# Patient Record
Sex: FEMALE | Race: WHITE | Employment: UNEMPLOYED | URBAN - METROPOLITAN AREA
[De-identification: names, ages, dates, MRNs, and addresses within clinical notes are randomized per-mention and may not be internally consistent; named-entity substitution may affect disease eponyms.]

---

## 2017-12-05 ENCOUNTER — HOSPITAL ENCOUNTER (OUTPATIENT)
Dept: NON INVASIVE DIAGNOSTICS | Age: 47
Discharge: HOME | End: 2017-12-05
Attending: NURSE PRACTITIONER
Payer: COMMERCIAL

## 2017-12-05 DIAGNOSIS — R00.2 PALPITATIONS: ICD-10-CM

## 2017-12-05 PROCEDURE — 93225 XTRNL ECG REC<48 HRS REC: CPT

## 2018-01-02 ENCOUNTER — HOSPITAL ENCOUNTER (OUTPATIENT)
Dept: MAMMOGRAPHY | Age: 48
Discharge: HOME | End: 2018-01-02
Attending: FAMILY MEDICINE
Payer: COMMERCIAL

## 2018-01-02 DIAGNOSIS — Z12.31 VISIT FOR SCREENING MAMMOGRAM: ICD-10-CM

## 2018-01-02 PROCEDURE — 77063 BREAST TOMOSYNTHESIS BI: CPT

## 2018-01-30 ENCOUNTER — HOSPITAL ENCOUNTER (OUTPATIENT)
Dept: NUCLEAR MEDICINE | Age: 48
Discharge: HOME | End: 2018-01-30
Attending: INTERNAL MEDICINE
Payer: COMMERCIAL

## 2018-01-30 VITALS — BODY MASS INDEX: 27.9 KG/M2 | WEIGHT: 160 LBS

## 2018-01-30 DIAGNOSIS — R10.13 EPIGASTRIC PAIN: ICD-10-CM

## 2018-01-30 PROCEDURE — 74011000258 HC RX REV CODE- 258: Performed by: INTERNAL MEDICINE

## 2018-01-30 PROCEDURE — 74011250636 HC RX REV CODE- 250/636: Performed by: INTERNAL MEDICINE

## 2018-01-30 PROCEDURE — 78227 HEPATOBIL SYST IMAGE W/DRUG: CPT

## 2018-01-30 RX ORDER — SODIUM CHLORIDE 9 MG/ML
50 INJECTION, SOLUTION INTRAVENOUS CONTINUOUS
Status: DISCONTINUED | OUTPATIENT
Start: 2018-01-30 | End: 2018-02-03 | Stop reason: HOSPADM

## 2018-01-30 RX ADMIN — SINCALIDE 1.45 MCG: 5 INJECTION, POWDER, LYOPHILIZED, FOR SOLUTION INTRAVENOUS at 10:37

## 2018-01-30 RX ADMIN — SODIUM CHLORIDE 50 ML/HR: 900 INJECTION, SOLUTION INTRAVENOUS at 10:37

## 2018-02-09 ENCOUNTER — OFFICE VISIT (OUTPATIENT)
Dept: SURGERY | Age: 48
End: 2018-02-09

## 2018-02-09 VITALS
HEIGHT: 64 IN | RESPIRATION RATE: 16 BRPM | SYSTOLIC BLOOD PRESSURE: 120 MMHG | DIASTOLIC BLOOD PRESSURE: 78 MMHG | TEMPERATURE: 98.1 F | BODY MASS INDEX: 27.96 KG/M2 | WEIGHT: 163.8 LBS | HEART RATE: 75 BPM | OXYGEN SATURATION: 97 %

## 2018-02-09 DIAGNOSIS — K82.8 BILIARY DYSKINESIA: Primary | ICD-10-CM

## 2018-02-09 DIAGNOSIS — R10.11 ABDOMINAL PAIN, RIGHT UPPER QUADRANT: ICD-10-CM

## 2018-02-09 RX ORDER — FLUOXETINE HYDROCHLORIDE 40 MG/1
CAPSULE ORAL
Refills: 3 | COMMUNITY
Start: 2018-01-16

## 2018-02-09 RX ORDER — PANTOPRAZOLE SODIUM 40 MG/1
TABLET, DELAYED RELEASE ORAL
Refills: 1 | COMMUNITY
Start: 2018-01-11

## 2018-02-09 NOTE — LETTER
2/9/2018 1:46 PM 
 
Patient:  Jessica Golden YOB: 1970 Date of Visit: 2/9/2018 Carmen Beatty MD 
[address] [place] [address] [address] [place] [address] VIA Facsimile: [phone] Dear Carmen Beatty MD, Thank you for referring Ms. Eliot Hong to [place] [address] for evaluation and treatment. Below are the relevant portions of my assessment and plan of care. Thank you very much for your referral of Ms. Eliot Hong. If you have questions, please do not hesitate to call me. I look forward to following Ms. Reynaldo London along with you and will keep you updated as to her progress. Sincerely, Anahi Leon MD

## 2018-02-09 NOTE — PROGRESS NOTES
General Surgery Consult      Jessica Golden  Admit date: (Not on file)    MRN: [id number]     : 1970     Age: 52 y.o. Attending Physician: Anahi Leon MD, FACS      Subjective:     Yovanny Beck is a 52 y.o. female with a history of abdominal pain. The pain is mainly localized in the epigastric area and the right upper quadrant. She has been having this pain for years she also has severe gastroesophageal reflux disease. She also has nausea and sometimes vomiting. She recently had upper endoscopy by Dr. Bridget Burk that was normal.  She had a CT scan in 2016 that did not show any explanation for her pain. She recently had a HIDA scan that showed biliary dyskinesia for which she was referred to me for cholecystectomy. She had a robotic hysterectomy in the past. The patient  has not had jaundice, acholic stools or dark urine and has not had a history of pancreatitis or hepatitis. Findings of ultrasound of the abdomen: normal.     There are no active problems to display for this patient. Past Medical History:   Diagnosis Date    Allergy       Past Surgical History:   Procedure Laterality Date    HX GYN      HX ORTHOPAEDIC        Social History   Substance Use Topics    Smoking status: Former Smoker     Quit date: 2009    Smokeless tobacco: Never Used    Alcohol use No      History   Smoking Status    Former Smoker    Quit date: 2009   Smokeless Tobacco    Never Used     No family history on file. Current Outpatient Prescriptions   Medication Sig    FLUoxetine (PROZAC) 40 mg capsule TAKE ONE CAPSULE BY MOUTH DAILY    pantoprazole (PROTONIX) 40 mg tablet TAKE 1 TABLET BY MOUTH 30 MINUTES BEFORE BREAKFAST    LEVOTHYROXINE SODIUM (LEVOTHYROXINE PO) Take  by mouth.  cholecalciferol, vitamin d3, (VITAMIN D3) 1,000 unit tablet Take  by mouth daily.  FERROUS FUMARATE/VIT BCOMP&C (SUPER B COMPLEX PO) Take  by mouth.     CYMBALTA 60 mg capsule     omeprazole (PRILOSEC) 40 mg capsule  ranitidine (ZANTAC) 300 mg tablet      No current facility-administered medications for this visit. Allergies   Allergen Reactions    Amoxicillin Other (comments)     Seizure      Bactrim [Sulfamethoprim] Hives        Review of Systems:  Constitutional: negative  Eyes: negative  Ears, Nose, Mouth, Throat, and Face: negative  Respiratory: negative  Cardiovascular: negative  Gastrointestinal: positive for nausea and abdominal pain  Genitourinary:negative  Integument/Breast: negative  Hematologic/Lymphatic: negative  Musculoskeletal:negative  Neurological: negative  Behavioral/Psychiatric: negative  Endocrine: negative  Allergic/Immunologic: negative    Objective:     Visit Vitals    /78 (BP 1 Location: Left arm, BP Patient Position: Sitting)    Pulse 75    Temp 98.1 °F (36.7 °C) (Oral)    Resp 16    Ht 5' 3.5\" (1.613 m)    Wt 74.3 kg (163 lb 12.8 oz)    SpO2 97%    BMI 28.56 kg/m2       Physical Exam:      General:  in no apparent distress, alert, oriented times 3, afebrile and normal vitals   Eyes:  conjunctivae and sclerae normal, pupils equal, round, reactive to light   Throat & Neck: no erythema or exudates noted and neck supple and symmetrical; no palpable masses   Lungs:   clear to auscultation bilaterally   Heart:  Regular rate and rhythm   Abdomen:   rounded, soft, non tender except for right upper quadrant and epigastric tenderness, nondistended, no masses or organomegaly.   No abdominal wall hernias   Extremities: extremities normal, atraumatic, no cyanosis or edema   Skin: Normal.         Imaging and Lab Review:     CBC:   Lab Results   Component Value Date/Time    WBC 8.4 08/27/2011 02:45 AM    RBC 4.12 (L) 08/27/2011 02:45 AM    HGB 12.1 08/27/2011 02:45 AM    HCT 36.5 08/27/2011 02:45 AM    PLATELET 277 93/96/9070 02:45 AM     BMP:   Lab Results   Component Value Date/Time    Glucose 95 08/27/2011 02:45 AM    Sodium 137 08/27/2011 02:45 AM    Potassium 3.8 08/27/2011 02:45 AM Chloride 102 08/27/2011 02:45 AM    CO2 34 (H) 08/27/2011 02:45 AM    BUN 9 08/27/2011 02:45 AM    Creatinine 0.8 08/27/2011 02:45 AM    Calcium 9.0 08/27/2011 02:45 AM     CMP:  Lab Results   Component Value Date/Time    Glucose 95 08/27/2011 02:45 AM    Sodium 137 08/27/2011 02:45 AM    Potassium 3.8 08/27/2011 02:45 AM    Chloride 102 08/27/2011 02:45 AM    CO2 34 (H) 08/27/2011 02:45 AM    BUN 9 08/27/2011 02:45 AM    Creatinine 0.8 08/27/2011 02:45 AM    Calcium 9.0 08/27/2011 02:45 AM    Anion gap 1 (L) 08/27/2011 02:45 AM    BUN/Creatinine ratio 11 (L) 08/27/2011 02:45 AM    Alk. phosphatase 79 08/27/2011 02:45 AM    Protein, total 7.2 08/27/2011 02:45 AM    Albumin 3.4 08/27/2011 02:45 AM    Globulin 3.8 08/27/2011 02:45 AM    A-G Ratio 0.9 08/27/2011 02:45 AM       No results found for this or any previous visit (from the past 24 hour(s)). images and reports reviewed    Assessment:   Saloni Garduno is a 52 y.o. female is presenting with abdominal pain and a picture of biliary dyskinesia. I explained the indications for robotic cholecystectomy as well as the alternatives. I discussed the potential risks, including but not limited to bleeding, wound infection, trocar injuries, bile duct injury and leak, and also the possible need for conversion to open procedure. I also explained the firefly technology and how it allow us to visualize the biliary tree to avoid bile duct injury or leak. She indicates that she understands the risks, accepts and wishes to proceed. Plan:     1. Will schedule for robotic cholecystectomy with firefly (ICG) technology for identification of the biliary tree. 2. No heavy lifting (more than 15 pounds) for 2 weeks after the surgery. 3. Avoid constipation after the surgery (take stool softener).       Please call me if you have any questions (cell phone: [phone])     Signed By: Rubi Neil MD     February 9, 2018

## 2018-02-09 NOTE — PATIENT INSTRUCTIONS
If you have any questions or concerns about today's appointment, the verbal and/or written instructions you were given for follow up care, please call our office at [phone]. [facility] - [address], [address] [place] [address]    [phone] office  [phone] fax    . PATIENT PRE AND POST OPERATIVE INSTRUCTIONS     [facility]   [address], [address]  [phone]    Before Surgery Instructions:   1) You must have someone available to drive you to and from your procedure and stay with you for the first 24 hours. 2) It is very important that you have nothing to eat or drink after midnight the night before your surgery. This includes chewing gum or sucking on hard candy. Take only heart, blood pressure and cholesterol medications the morning of surgery with only a sip of water. 3) Please stop taking Plavix 10 days prior to your surgery. Stop taking Coumadin 5 days prior to your surgery. Stop taking all Aspirin or Aspirin containing products 7 days prior to your surgery. Stop taking Advil, Motrin, Aleve, and etc. 3 days prior to your surgery. 4) If you take any diabetic medications please consult with your primary care physician on how to take them on the day of your surgery  5) Please stop all Herbal products 2 weeks prior to your surgery. 6) Please arrive at the hospital 2 hours prior to your surgery, unless you have been otherwise instructed. 7) Patients having an operation on their colon will be given a separate instruction sheet on their Bowel Prep. 8) For any pre-operative work up check in at the main entrance to [facility], and then go to Patient Registration. These studies are done on a walk in basis they are open from 7:00am to 5:00pm Monday through Friday. 9) Please wash your surgical site the morning of your surgery with soap and water.   10) If you are of child bearing age you will have pregnancy test done the morning of your surgery as soon as you arrive. 11) You may be contacted to change your surgery time. At times this is necessary due to equipment or staffing needs. After Surgery Instructions: You will need to be seen in the office for a follow-up visit 7-14 days after your surgery. Please call after you have had the procedure to make this appointment. Unless otherwise instructed, you may remove your outer bandage and shower 48 hours after your surgery. If you develop a fever greater than 101, have any significant drainage, bleeding, swelling and/or pus of the wound. Please call our office immediately. Surgery Date and Time:  Friday, February 16, 2018 at 7:30am    Please check in at [facility],  enter through the main entrance and go to patient registration. Please check in by 6:00am the day of your surgery. You may contact Dorys Perales with any questions at [phone].

## 2018-02-15 ENCOUNTER — ANESTHESIA EVENT (OUTPATIENT)
Dept: SURGERY | Age: 48
End: 2018-02-15
Payer: COMMERCIAL

## 2018-02-16 ENCOUNTER — ANESTHESIA (OUTPATIENT)
Dept: SURGERY | Age: 48
End: 2018-02-16
Payer: COMMERCIAL

## 2018-02-16 ENCOUNTER — HOSPITAL ENCOUNTER (OUTPATIENT)
Age: 48
Setting detail: OUTPATIENT SURGERY
Discharge: HOME | End: 2018-02-16
Attending: SURGERY | Admitting: SURGERY
Payer: COMMERCIAL

## 2018-02-16 VITALS
RESPIRATION RATE: 15 BRPM | BODY MASS INDEX: 28.53 KG/M2 | TEMPERATURE: 97.7 F | SYSTOLIC BLOOD PRESSURE: 125 MMHG | OXYGEN SATURATION: 96 % | WEIGHT: 161 LBS | DIASTOLIC BLOOD PRESSURE: 77 MMHG | HEIGHT: 63 IN | HEART RATE: 83 BPM

## 2018-02-16 DIAGNOSIS — K82.8 BILIARY DYSKINESIA: Primary | ICD-10-CM

## 2018-02-16 PROCEDURE — 88304 TISSUE EXAM BY PATHOLOGIST: CPT | Performed by: SURGERY

## 2018-02-16 PROCEDURE — 76010000934 HC OR TIME 1 TO 1.5HR INTENSV - TIER 2: Performed by: SURGERY

## 2018-02-16 PROCEDURE — 74011000258 HC RX REV CODE- 258: Performed by: ANESTHESIOLOGY

## 2018-02-16 PROCEDURE — 74011000250 HC RX REV CODE- 250: Performed by: SURGERY

## 2018-02-16 PROCEDURE — 74011250636 HC RX REV CODE- 250/636

## 2018-02-16 PROCEDURE — 77030019908 HC STETH ESOPH SIMS -A: Performed by: ANESTHESIOLOGY

## 2018-02-16 PROCEDURE — 77030035277 HC OBTRTR BLDELSS DISP INTU -B: Performed by: SURGERY

## 2018-02-16 PROCEDURE — 77030035048 HC TRCR ENDOSC OPTCL COVD -B: Performed by: SURGERY

## 2018-02-16 PROCEDURE — 76210000024 HC REC RM PH II 2.5 TO 3 HR: Performed by: SURGERY

## 2018-02-16 PROCEDURE — 74011000250 HC RX REV CODE- 250

## 2018-02-16 PROCEDURE — 77030010939 HC CLP LIG TELE -B: Performed by: SURGERY

## 2018-02-16 PROCEDURE — 77030018836 HC SOL IRR NACL ICUM -A: Performed by: SURGERY

## 2018-02-16 PROCEDURE — 77030037892: Performed by: SURGERY

## 2018-02-16 PROCEDURE — 74011250637 HC RX REV CODE- 250/637: Performed by: NURSE ANESTHETIST, CERTIFIED REGISTERED

## 2018-02-16 PROCEDURE — 77030008683 HC TU ET CUF COVD -A: Performed by: ANESTHESIOLOGY

## 2018-02-16 PROCEDURE — 74011250637 HC RX REV CODE- 250/637: Performed by: SURGERY

## 2018-02-16 PROCEDURE — 77030010507 HC ADH SKN DERMBND J&J -B: Performed by: SURGERY

## 2018-02-16 PROCEDURE — 74011250636 HC RX REV CODE- 250/636: Performed by: NURSE ANESTHETIST, CERTIFIED REGISTERED

## 2018-02-16 PROCEDURE — 77030032490 HC SLV COMPR SCD KNE COVD -B: Performed by: SURGERY

## 2018-02-16 PROCEDURE — 76060000033 HC ANESTHESIA 1 TO 1.5 HR: Performed by: SURGERY

## 2018-02-16 PROCEDURE — 74011250636 HC RX REV CODE- 250/636: Performed by: ANESTHESIOLOGY

## 2018-02-16 PROCEDURE — 77030011640 HC PAD GRND REM COVD -A: Performed by: SURGERY

## 2018-02-16 PROCEDURE — 74011000254 HC RX REV CODE- 254: Performed by: SURGERY

## 2018-02-16 PROCEDURE — 77030019605: Performed by: SURGERY

## 2018-02-16 PROCEDURE — 76210000000 HC OR PH I REC 2 TO 2.5 HR: Performed by: SURGERY

## 2018-02-16 PROCEDURE — 77030020703 HC SEAL CANN DISP INTU -B: Performed by: SURGERY

## 2018-02-16 PROCEDURE — 77030013079 HC BLNKT BAIR HGGR 3M -A: Performed by: ANESTHESIOLOGY

## 2018-02-16 PROCEDURE — 74011250637 HC RX REV CODE- 250/637: Performed by: ANESTHESIOLOGY

## 2018-02-16 PROCEDURE — 77030002933 HC SUT MCRYL J&J -A: Performed by: SURGERY

## 2018-02-16 RX ORDER — CIPROFLOXACIN 2 MG/ML
INJECTION, SOLUTION INTRAVENOUS AS NEEDED
Status: DISCONTINUED | OUTPATIENT
Start: 2018-02-16 | End: 2018-02-16 | Stop reason: HOSPADM

## 2018-02-16 RX ORDER — FENTANYL CITRATE 50 UG/ML
INJECTION, SOLUTION INTRAMUSCULAR; INTRAVENOUS AS NEEDED
Status: DISCONTINUED | OUTPATIENT
Start: 2018-02-16 | End: 2018-02-16 | Stop reason: HOSPADM

## 2018-02-16 RX ORDER — INSULIN LISPRO 100 [IU]/ML
INJECTION, SOLUTION INTRAVENOUS; SUBCUTANEOUS ONCE
Status: DISCONTINUED | OUTPATIENT
Start: 2018-02-16 | End: 2018-02-16 | Stop reason: HOSPADM

## 2018-02-16 RX ORDER — SODIUM CHLORIDE 0.9 % (FLUSH) 0.9 %
5-10 SYRINGE (ML) INJECTION AS NEEDED
Status: DISCONTINUED | OUTPATIENT
Start: 2018-02-16 | End: 2018-02-16 | Stop reason: HOSPADM

## 2018-02-16 RX ORDER — WATER FOR INJECTION,STERILE
VIAL (ML) INJECTION
Status: COMPLETED
Start: 2018-02-16 | End: 2018-02-16

## 2018-02-16 RX ORDER — ROCURONIUM BROMIDE 10 MG/ML
INJECTION, SOLUTION INTRAVENOUS AS NEEDED
Status: DISCONTINUED | OUTPATIENT
Start: 2018-02-16 | End: 2018-02-16 | Stop reason: HOSPADM

## 2018-02-16 RX ORDER — FAMOTIDINE 20 MG/1
20 TABLET, FILM COATED ORAL ONCE
Status: COMPLETED | OUTPATIENT
Start: 2018-02-16 | End: 2018-02-16

## 2018-02-16 RX ORDER — PROMETHAZINE HYDROCHLORIDE 25 MG/ML
INJECTION, SOLUTION INTRAMUSCULAR; INTRAVENOUS
Status: DISCONTINUED
Start: 2018-02-16 | End: 2018-02-16 | Stop reason: HOSPADM

## 2018-02-16 RX ORDER — OXYCODONE AND ACETAMINOPHEN 10; 325 MG/1; MG/1
1 TABLET ORAL ONCE
Status: COMPLETED | OUTPATIENT
Start: 2018-02-16 | End: 2018-02-16

## 2018-02-16 RX ORDER — ONDANSETRON 2 MG/ML
INJECTION INTRAMUSCULAR; INTRAVENOUS AS NEEDED
Status: DISCONTINUED | OUTPATIENT
Start: 2018-02-16 | End: 2018-02-16 | Stop reason: HOSPADM

## 2018-02-16 RX ORDER — BUPIVACAINE HYDROCHLORIDE 5 MG/ML
INJECTION, SOLUTION EPIDURAL; INTRACAUDAL AS NEEDED
Status: DISCONTINUED | OUTPATIENT
Start: 2018-02-16 | End: 2018-02-16 | Stop reason: HOSPADM

## 2018-02-16 RX ORDER — PROPOFOL 10 MG/ML
INJECTION, EMULSION INTRAVENOUS AS NEEDED
Status: DISCONTINUED | OUTPATIENT
Start: 2018-02-16 | End: 2018-02-16 | Stop reason: HOSPADM

## 2018-02-16 RX ORDER — MIDAZOLAM HYDROCHLORIDE 1 MG/ML
INJECTION, SOLUTION INTRAMUSCULAR; INTRAVENOUS AS NEEDED
Status: DISCONTINUED | OUTPATIENT
Start: 2018-02-16 | End: 2018-02-16 | Stop reason: HOSPADM

## 2018-02-16 RX ORDER — SODIUM CHLORIDE 0.9 % (FLUSH) 0.9 %
5-10 SYRINGE (ML) INJECTION EVERY 8 HOURS
Status: DISCONTINUED | OUTPATIENT
Start: 2018-02-16 | End: 2018-02-16 | Stop reason: HOSPADM

## 2018-02-16 RX ORDER — DEXTROSE 50 % IN WATER (D50W) INTRAVENOUS SYRINGE
25-50 AS NEEDED
Status: DISCONTINUED | OUTPATIENT
Start: 2018-02-16 | End: 2018-02-16 | Stop reason: HOSPADM

## 2018-02-16 RX ORDER — DEXAMETHASONE SODIUM PHOSPHATE 4 MG/ML
INJECTION, SOLUTION INTRA-ARTICULAR; INTRALESIONAL; INTRAMUSCULAR; INTRAVENOUS; SOFT TISSUE AS NEEDED
Status: DISCONTINUED | OUTPATIENT
Start: 2018-02-16 | End: 2018-02-16 | Stop reason: HOSPADM

## 2018-02-16 RX ORDER — LIDOCAINE HYDROCHLORIDE 10 MG/ML
0.1 INJECTION, SOLUTION EPIDURAL; INFILTRATION; INTRACAUDAL; PERINEURAL AS NEEDED
Status: DISCONTINUED | OUTPATIENT
Start: 2018-02-16 | End: 2018-02-16 | Stop reason: HOSPADM

## 2018-02-16 RX ORDER — CIPROFLOXACIN 2 MG/ML
400 INJECTION, SOLUTION INTRAVENOUS ONCE
Status: DISCONTINUED | OUTPATIENT
Start: 2018-02-16 | End: 2018-02-16 | Stop reason: HOSPADM

## 2018-02-16 RX ORDER — OXYCODONE AND ACETAMINOPHEN 5; 325 MG/1; MG/1
1 TABLET ORAL
Qty: 24 TAB | Refills: 0 | Status: SHIPPED | OUTPATIENT
Start: 2018-02-16

## 2018-02-16 RX ORDER — MAGNESIUM SULFATE 100 %
4 CRYSTALS MISCELLANEOUS AS NEEDED
Status: DISCONTINUED | OUTPATIENT
Start: 2018-02-16 | End: 2018-02-16 | Stop reason: HOSPADM

## 2018-02-16 RX ORDER — SODIUM CHLORIDE, SODIUM LACTATE, POTASSIUM CHLORIDE, CALCIUM CHLORIDE 600; 310; 30; 20 MG/100ML; MG/100ML; MG/100ML; MG/100ML
75 INJECTION, SOLUTION INTRAVENOUS CONTINUOUS
Status: DISCONTINUED | OUTPATIENT
Start: 2018-02-16 | End: 2018-02-16 | Stop reason: HOSPADM

## 2018-02-16 RX ORDER — LIDOCAINE HYDROCHLORIDE 20 MG/ML
INJECTION, SOLUTION EPIDURAL; INFILTRATION; INTRACAUDAL; PERINEURAL AS NEEDED
Status: DISCONTINUED | OUTPATIENT
Start: 2018-02-16 | End: 2018-02-16 | Stop reason: HOSPADM

## 2018-02-16 RX ORDER — NEOSTIGMINE METHYLSULFATE 5 MG/5 ML
SYRINGE (ML) INTRAVENOUS AS NEEDED
Status: DISCONTINUED | OUTPATIENT
Start: 2018-02-16 | End: 2018-02-16 | Stop reason: HOSPADM

## 2018-02-16 RX ORDER — INDOCYANINE GREEN AND WATER 25 MG
3 KIT INJECTION
Status: COMPLETED | OUTPATIENT
Start: 2018-02-16 | End: 2018-02-16

## 2018-02-16 RX ORDER — KETOROLAC TROMETHAMINE 30 MG/ML
INJECTION, SOLUTION INTRAMUSCULAR; INTRAVENOUS AS NEEDED
Status: DISCONTINUED | OUTPATIENT
Start: 2018-02-16 | End: 2018-02-16 | Stop reason: HOSPADM

## 2018-02-16 RX ORDER — FENTANYL CITRATE 50 UG/ML
25 INJECTION, SOLUTION INTRAMUSCULAR; INTRAVENOUS AS NEEDED
Status: DISCONTINUED | OUTPATIENT
Start: 2018-02-16 | End: 2018-02-16 | Stop reason: HOSPADM

## 2018-02-16 RX ORDER — GLYCOPYRROLATE 0.2 MG/ML
INJECTION INTRAMUSCULAR; INTRAVENOUS AS NEEDED
Status: DISCONTINUED | OUTPATIENT
Start: 2018-02-16 | End: 2018-02-16 | Stop reason: HOSPADM

## 2018-02-16 RX ORDER — ONDANSETRON 2 MG/ML
4 INJECTION INTRAMUSCULAR; INTRAVENOUS ONCE
Status: COMPLETED | OUTPATIENT
Start: 2018-02-16 | End: 2018-02-16

## 2018-02-16 RX ORDER — OXYCODONE AND ACETAMINOPHEN 5; 325 MG/1; MG/1
1 TABLET ORAL ONCE
Status: COMPLETED | OUTPATIENT
Start: 2018-02-16 | End: 2018-02-16

## 2018-02-16 RX ADMIN — PROPOFOL 200 MG: 10 INJECTION, EMULSION INTRAVENOUS at 07:31

## 2018-02-16 RX ADMIN — Medication 2 MG: at 08:21

## 2018-02-16 RX ADMIN — FENTANYL CITRATE 25 MCG: 50 INJECTION INTRAMUSCULAR; INTRAVENOUS at 08:53

## 2018-02-16 RX ADMIN — FENTANYL CITRATE 50 MCG: 50 INJECTION, SOLUTION INTRAMUSCULAR; INTRAVENOUS at 08:10

## 2018-02-16 RX ADMIN — FENTANYL CITRATE 25 MCG: 50 INJECTION INTRAMUSCULAR; INTRAVENOUS at 09:13

## 2018-02-16 RX ADMIN — FENTANYL CITRATE 50 MCG: 50 INJECTION, SOLUTION INTRAMUSCULAR; INTRAVENOUS at 07:31

## 2018-02-16 RX ADMIN — OXYCODONE HYDROCHLORIDE AND ACETAMINOPHEN 1 TABLET: 5; 325 TABLET ORAL at 11:41

## 2018-02-16 RX ADMIN — GLYCOPYRROLATE 0.4 MG: 0.2 INJECTION INTRAMUSCULAR; INTRAVENOUS at 08:21

## 2018-02-16 RX ADMIN — INDOCYANINE GREEN AND WATER 3 MG: KIT at 06:36

## 2018-02-16 RX ADMIN — FENTANYL CITRATE 25 MCG: 50 INJECTION INTRAMUSCULAR; INTRAVENOUS at 08:48

## 2018-02-16 RX ADMIN — DEXAMETHASONE SODIUM PHOSPHATE 4 MG: 4 INJECTION, SOLUTION INTRA-ARTICULAR; INTRALESIONAL; INTRAMUSCULAR; INTRAVENOUS; SOFT TISSUE at 07:31

## 2018-02-16 RX ADMIN — ONDANSETRON 4 MG: 2 SOLUTION INTRAMUSCULAR; INTRAVENOUS at 08:48

## 2018-02-16 RX ADMIN — CIPROFLOXACIN 400 MG: 2 INJECTION, SOLUTION INTRAVENOUS at 07:34

## 2018-02-16 RX ADMIN — SODIUM CHLORIDE, SODIUM LACTATE, POTASSIUM CHLORIDE, AND CALCIUM CHLORIDE 75 ML/HR: 600; 310; 30; 20 INJECTION, SOLUTION INTRAVENOUS at 06:36

## 2018-02-16 RX ADMIN — FENTANYL CITRATE 25 MCG: 50 INJECTION INTRAMUSCULAR; INTRAVENOUS at 09:20

## 2018-02-16 RX ADMIN — KETOROLAC TROMETHAMINE 30 MG: 30 INJECTION, SOLUTION INTRAMUSCULAR; INTRAVENOUS at 07:31

## 2018-02-16 RX ADMIN — PROMETHAZINE HYDROCHLORIDE 6.25 MG: 25 INJECTION INTRAMUSCULAR; INTRAVENOUS at 09:13

## 2018-02-16 RX ADMIN — WATER: 1 INJECTION INTRAMUSCULAR; INTRAVENOUS; SUBCUTANEOUS at 06:36

## 2018-02-16 RX ADMIN — MIDAZOLAM HYDROCHLORIDE 2 MG: 1 INJECTION, SOLUTION INTRAMUSCULAR; INTRAVENOUS at 07:31

## 2018-02-16 RX ADMIN — ONDANSETRON 4 MG: 2 INJECTION INTRAMUSCULAR; INTRAVENOUS at 07:31

## 2018-02-16 RX ADMIN — ROCURONIUM BROMIDE 30 MG: 10 INJECTION, SOLUTION INTRAVENOUS at 07:31

## 2018-02-16 RX ADMIN — OXYCODONE HYDROCHLORIDE AND ACETAMINOPHEN 1 TABLET: 10; 325 TABLET ORAL at 13:32

## 2018-02-16 RX ADMIN — LIDOCAINE HYDROCHLORIDE 40 MG: 20 INJECTION, SOLUTION EPIDURAL; INFILTRATION; INTRACAUDAL; PERINEURAL at 07:31

## 2018-02-16 RX ADMIN — FAMOTIDINE 20 MG: 20 TABLET, FILM COATED ORAL at 06:36

## 2018-02-16 NOTE — DISCHARGE INSTRUCTIONS
Instructions Following Ambulatory Surgery    Patient: Montez Mcnally MRN: [id number]  SSN: [id number]    YOB: 1970  Age: 52 y.o. Sex: female      Activity  · As tolerated, walking encourage, stairs are okay  · Avoid strenuous activities - no lifting anything heavier than 15 pounds. · You may shower at home after 48 hours. Diet  · Regular diet after nausea from the anesthetic has passed. Pain  · Take pain medication as directed by your doctor  ·  Call your doctor if pain is NOT relieved by medication    Dressing Care  · Remove outer dressing (if any) after 48 hours. Leave steri-strips (if any) in place until they fall off. After Anesthesia  · For the first 24 hours: DO NOT Drive, Drink alcoholic beverages, or Make important decisions  · Be aware of dizziness following anesthesia and while taking pain medication    Call your doctor if  · Excessive bleeding that does not stop after holding mild pressure over the area  · Temperature of 101 degrees F or above  · Redness,excessive swelling or bruising, and/or green or yellow, smelly discharge from incision  · If nausea and vomiting continues    Follow-Up Phone Calls  · Call the office at [phone] to make your follow-up appointment    Appointment date/time: 2 weeks after the surgery. Dr. Saloni Armijo cell phone number is [phone]. Please call me if you have any concerns or questions. Low-Fat Diet for Gallbladder Disease: Care Instructions  Your Care Instructions    When you eat, the gallbladder releases bile, which helps you digest the fat in food. If you have an inflamed gallbladder, this may cause pain. A low-fat diet may give your gallbladder a rest so you can start to heal. Your doctor and dietitian can help you make an eating plan that does not irritate your digestive system. Always talk with your doctor or dietitian before you make changes in your diet. Follow-up care is a key part of your treatment and safety.  Be sure to make and go to all appointments, and call your doctor if you are having problems. It's also a good idea to know your test results and keep a list of the medicines you take. How can you care for yourself at home? · Eat many small meals and snacks each day instead of three large meals. · Choose lean meats. ¨ Eat no more than 5 to 6½ ounces of meat a day. ¨ Cut off all fat you can see. ¨ Eat chicken and turkey without the skin. ¨ Many types of fish, such as salmon, lake trout, tuna, and herring, provide healthy omega-3 fat. But, avoid fish canned in oil, such as sardines in olive oil. ¨ Bake, broil, or grill meats, poultry, or fish instead of frying them in butter or fat. · Drink or eat nonfat or low-fat milk, yogurt, cheese, or other milk products each day. ¨ Read the labels on cheeses, and choose those with less than 5 grams of fat an ounce. ¨ Try fat-free sour cream, cream cheese, or yogurt. ¨ Avoid cream soups and cream sauces on pasta. ¨ Eat low-fat ice cream, frozen yogurt, or sorbet. Avoid regular ice cream.  · Eat whole-grain cereals, breads, crackers, rice, or pasta. Avoid high-fat foods such as croissants, scones, biscuits, waffles, doughnuts, muffins, granola, and high-fat breads. · Flavor your foods with herbs and spices (such as basil, tarragon, or mint), fat-free sauces, or lemon juice instead of butter. You can also use butter substitutes, fat-free mayonnaise, or fat-free dressing. · Try applesauce, prune puree, or mashed bananas to replace some or all of the fat when you bake. · Limit fats and oils, such as butter, margarine, mayonnaise, and salad dressing, to no more than 1 tablespoon a meal.  · Avoid high-fat foods, such as:  ¨ Chocolate, whole milk, ice cream, and processed cheese. ¨ Fried or buttered foods. ¨ Sausage, salami, and brar. ¨ Cinnamon rolls, cakes, pies, cookies, and other pastries.   ¨ Prepared snack foods, such as potato chips, nut and granola bars, and mixed nuts.  ¨ Coconut and avocado. · Learn how to read food labels for serving sizes and ingredients. Fast-food and convenience-food meals often have lots of fat. Where can you learn more? Go to http://anupam-carissa.info/. Enter U500 in the search box to learn more about \"Low-Fat Diet for Gallbladder Disease: Care Instructions. \"  Current as of: May 12, 2017  Content Version: 11.4  © 0986-4254 [employer]. Care instructions adapted under license by [employer] (which disclaims liability or warranty for this information). If you have questions about a medical condition or this instruction, always ask your healthcare professional. [place] [address] any warranty or liability for your use of this information. Narcotic-Analgesic/Acetaminophen (Percocet, Norco, Lorcet HD, Lortab 10/325) - (By mouth)   Why this medicine is used:   Relieves pain. Contact a nurse or doctor right away if you have:  · Extreme weakness, shallow breathing, slow heartbeat  · Severe confusion, lightheadedness, dizziness, fainting  · Yellow skin or eyes, dark urine or pale stools  · Severe constipation, severe stomach pain, nausea, vomiting, loss of appetite  · Sweating or cold, clammy skin     Common side effects:  · Mild constipation, nausea, vomiting  · Sleepiness, tiredness  · Itching, rash  © 2017 [address] [employer] Information is for End User's use only and may not be sold, redistributed or otherwise used for commercial purposes. DISCHARGE SUMMARY from Nurse    PATIENT INSTRUCTIONS:    After general anesthesia or intravenous sedation, for 24 hours or while taking prescription Narcotics:  · Limit your activities  · Do not drive and operate hazardous machinery  · Do not make important personal or business decisions  · Do  not drink alcoholic beverages  · If you have not urinated within 8 hours after discharge, please contact your surgeon on call.     Report the following to your surgeon:  · Excessive pain, swelling, redness or odor of or around the surgical area  · Temperature over 100.5  · Nausea and vomiting lasting longer than 4 hours or if unable to take medications  · Any signs of decreased circulation or nerve impairment to extremity: change in color, persistent  numbness, tingling, coldness or increase pain  · Any questions    *  Please give a list of your current medications to your Primary Care Provider. *  Please update this list whenever your medications are discontinued, doses are      changed, or new medications (including over-the-counter products) are added. *  Please carry medication information at all times in case of emergency situations. These are general instructions for a healthy lifestyle:    No smoking/ No tobacco products/ Avoid exposure to second hand smoke  Surgeon General's Warning:  Quitting smoking now greatly reduces serious risk to your health. Obesity, smoking, and sedentary lifestyle greatly increases your risk for illness    A healthy diet, regular physical exercise & weight monitoring are important for maintaining a healthy lifestyle    You may be retaining fluid if you have a history of heart failure or if you experience any of the following symptoms:  Weight gain of 3 pounds or more overnight or 5 pounds in a week, increased swelling in our hands or feet or shortness of breath while lying flat in bed. Please call your doctor as soon as you notice any of these symptoms; do not wait until your next office visit. Recognize signs and symptoms of STROKE:    F-face looks uneven    A-arms unable to move or move unevenly    S-speech slurred or non-existent    T-time-call 911 as soon as signs and symptoms begin-DO NOT go       Back to bed or wait to see if you get better-TIME IS BRAIN. Warning Signs of HEART ATTACK     Call 911 if you have these symptoms:   Chest discomfort.  Most heart attacks involve discomfort in the center of the chest that lasts more than a few minutes, or that goes away and comes back. It can feel like uncomfortable pressure, squeezing, fullness, or pain.  Discomfort in other areas of the upper body. Symptoms can include pain or discomfort in one or both arms, the back, neck, jaw, or stomach.  Shortness of breath with or without chest discomfort.  Other signs may include breaking out in a cold sweat, nausea, or lightheadedness. Don't wait more than five minutes to call 911 - MINUTES MATTER! Fast action can save your life. Calling 911 is almost always the fastest way to get lifesaving treatment. Emergency Medical Services staff can begin treatment when they arrive -- up to an hour sooner than if someone gets to the hospital by car. The discharge information has been reviewed with the patient and spouse. The patient and spouse verbalized understanding. Discharge medications reviewed with the patient and spouse and appropriate educational materials and side effects teaching were provided.   ___________________________________________________________________________________________________________________________________

## 2018-02-16 NOTE — OP NOTES
[facility] REPORT    Sudhir Broderick  MR#: [id number]  : 1970  ACCOUNT #: [de-identified]   DATE OF SERVICE: 2018    SURGEON:  Rafal Simmons MD    PREOPERATIVE DIAGNOSIS:  Biliary dyskinesia. POSTOPERATIVE DIAGNOSIS:  Biliary dyskinesia. PROCEDURE PERFORMED:  Robotic cholecystectomy with FireFly to identify the biliary tree. ANESTHESIA:  General.    COMPLICATIONS:  None. SPECIMEN:  Gallbladder. BLOOD LOSS:  Minimal.       DETAILS OF PROCEDURE:  The patient was brought to operating room. Anesthesia was induced. Scrub, prep and drape of the abdomen was done in the usual manner. A timeout was performed. A skin incision in the left upper quadrant was performed. Veress needle was inserted. Abdomen was insufflated. An 8 mm port was inserted in the left side of the abdominal wall. The abdomen was explored and there was no adhesion or scarring there from the previous hysterectomy. There was a very small hernia at the site of the extraction just below the umbilicus, a total of about 1 cm in size, so I used it to place the camera port and then I placed another 8 mm port on the left side of the abdominal wall and then I placed a 5 mm port in the right upper quadrant. At this point, the robot was docked. The gallbladder was identified. It was retracted cephalad. Cystic duct and cystic artery were dissected. FireFly was used to identify the cystic duct with the common bile duct. The cystic duct was clipped and divided. The cystic artery was cauterized. The gallbladder was taken out from the liver bed by using electrocautery. Hemostasis was secured. At this point, an Endo Catch was placed through the umbilical incision and the gallbladder was placed inside the Endo Catch and it was removed from this incision. Hemostasis well secured. The robot was undocked and all the skin incisions were closed with 4-0 Monocryl.       ASSISTANT:  Of note, Dr. Rafaela Wilson did assist me in the surgery for educational purpose.       MD Ceci Malcolm  D: 02/16/2018 08:27     T: 02/16/2018 09:10  JOB #: [id number]

## 2018-02-16 NOTE — ANESTHESIA PREPROCEDURE EVALUATION
Anesthetic History   No history of anesthetic complications            Review of Systems / Medical History  Patient summary reviewed and pertinent labs reviewed    Pulmonary                Comments: Ex smoker   Neuro/Psych     seizures: well controlled         Cardiovascular                  Exercise tolerance: >4 METS     GI/Hepatic/Renal  Within defined limits              Endo/Other      Hypothyroidism: well controlled       Other Findings   Comments: Documentation of current medication  Current medications obtained, documented and obtained? YES      Risk Factors for Postoperative nausea/vomiting:       History of postoperative nausea/vomiting? NO       Female? YES       Motion sickness? NO       Intended opioid administration for postoperative analgesia? YES      Smoking Abstinence:  Current Smoker? NO  Elective Surgery? YES  Seen preoperatively by anesthesiologist or proxy prior to day of surgery? YES  Pt abstained from smoking 24 hours prior to anesthesia?  YES    Preventive care/screening for High Blood Pressure:  Aged 18 years and older: YES  Screened for high blood pressure: YES  Patients with high blood pressure referred to primary care provider   for BP management: YES                 Physical Exam    Airway  Mallampati: II  TM Distance: > 6 cm  Neck ROM: normal range of motion   Mouth opening: Normal     Cardiovascular  Regular rate and rhythm,  S1 and S2 normal,  no murmur, click, rub, or gallop             Dental    Dentition: Upper partial plate     Pulmonary  Breath sounds clear to auscultation               Abdominal  GI exam deferred       Other Findings            Anesthetic Plan    ASA: 2  Anesthesia type: general          Induction: Intravenous  Anesthetic plan and risks discussed with: Patient

## 2018-02-16 NOTE — ANESTHESIA POSTPROCEDURE EVALUATION
Post-Anesthesia Evaluation and Assessment    Patient: Lisa Anglin MRN: [id number]  SSN: [id number]    YOB: 1970  Age: 52 y.o. Sex: female       Cardiovascular Function/Vital Signs  Visit Vitals    /81    Pulse 78    Temp 36.5 °C (97.7 °F)    Resp 15    Ht 5' 3\" (1.6 m)    Wt 73 kg (161 lb)    SpO2 96%    BMI 28.52 kg/m2       Patient is status post general anesthesia for Procedure(s):  ROBOTIC ASSISTED MULTIPORT CHOLECYSTECTOMY WITH FIREFLY. Nausea/Vomiting: None    Postoperative hydration reviewed and adequate. Pain:  Pain Scale 1: Visual (02/16/18 0838)  Pain Intensity 1: 8 (02/16/18 3976)   Managed    Neurological Status:   Neuro (WDL): Within Defined Limits (02/16/18 1639)   At baseline    Mental Status and Level of Consciousness: Arousable    Pulmonary Status:   O2 Device: Oxygen mask (02/16/18 0838)   Adequate oxygenation and airway patent    Complications related to anesthesia: None    Post-anesthesia assessment completed.  No concerns    Signed By: Melissa Recinos MD     February 16, 2018

## 2018-02-16 NOTE — BRIEF OP NOTE
BRIEF OPERATIVE NOTE    Date of Procedure: 2/16/2018   Preoperative Diagnosis: Biliary dyskinesia [K82.8]  RUQ abdominal pain [R10.11]  Postoperative Diagnosis: Biliary dyskinesia [K82.8]  RUQ abdominal pain [R10.11]    Procedure(s):  ROBOTIC ASSISTED MULTIPORT CHOLECYSTECTOMY WITH FIREFLY  Surgeon(s) and Role:     * Alexander Simon MD - Primary     * Vania Ordonez MD - Assisting         Assistant Staff: None      Surgical Staff:  Circ-1: Julián Luna  Scrub Tech-1: Della Lutz  Surg Asst-1: [place] Ax  Event Time In   Incision Start 3532   Incision Close      Anesthesia: General   Estimated Blood Loss: Minimal  Specimens:   ID Type Source Tests Collected by Time Destination   1 : gallbladder with contents Preservative Gallbladder  Alexander Simon MD 2/16/2018 0868 Pathology      Findings: Normal looking gallbladder   Complications: None  Implants: * No implants in log *

## 2018-02-16 NOTE — IP AVS SNAPSHOT
[address] [address] 
[phone] Patient: Jt Toledo MRN: [id number] [facility]:3/42[birth year]58 About your hospitalization You were admitted on:  February 16, 2018 You last received care in the:  1316 [facility] PACU You were discharged on:  February 16, 2018 Why you were hospitalized Your primary diagnosis was:  Not on File Follow-up Information Follow up With Details Comments Contact Info [address] [place]   [address] [address] [facility] [address] 
[phone] Zach Santos MD  Follow up in 2 weeks [address] [facility] [address]  [phone] [id number] [phone] Your Scheduled Appointments Monday March 05, 2018  1:00 PM EST  
POST OP with Zach Santos MD  
[facility] [address] [facility] 240 200 [facility]  
[phone] Discharge Orders None A check cathryn indicates which time of day the medication should be taken. My Medications START taking these medications Instructions Each Dose to Equal  
 Morning Noon Evening Bedtime  
 oxyCODONE-acetaminophen 5-325 mg per tablet Commonly known as:  PERCOCET Your last dose was: Your next dose is: Take 1 Tab by mouth every four (4) hours as needed for Pain. Max Daily Amount: 6 Tabs. 1 Tab CONTINUE taking these medications Instructions Each Dose to Equal  
 Morning Noon Evening Bedtime  
 cholecalciferol 1,000 unit tablet Commonly known as:  VITAMIN D3 Your last dose was: Your next dose is: Take  by mouth daily. FLUoxetine 40 mg capsule Commonly known as:  PROzac Your last dose was: Your next dose is: TAKE ONE CAPSULE BY MOUTH DAILY  LEVOTHYROXINE PO  
   
 Your last dose was: Your next dose is: Take  by mouth.  
     
   
   
   
  
 pantoprazole 40 mg tablet Commonly known as:  PROTONIX Your last dose was: Your next dose is: TAKE 1 TABLET BY MOUTH 30 MINUTES BEFORE BREAKFAST Where to Get Your Medications Information on where to get these meds will be given to you by the nurse or doctor. ! Ask your nurse or doctor about these medications  
  oxyCODONE-acetaminophen 5-325 mg per tablet Discharge Instructions Instructions Following Ambulatory Surgery Patient: Makayla Hernandez MRN: [id number]  SSN: [id number] YOB: 1970  Age: 52 y.o. Sex: female Activity · As tolerated, walking encourage, stairs are okay · Avoid strenuous activities - no lifting anything heavier than 15 pounds. · You may shower at home after 48 hours. Diet · Regular diet after nausea from the anesthetic has passed. Pain · Take pain medication as directed by your doctor ·  Call your doctor if pain is NOT relieved by medication Dressing Care · Remove outer dressing (if any) after 48 hours. Leave steri-strips (if any) in place until they fall off. After Anesthesia · For the first 24 hours: DO NOT Drive, Drink alcoholic beverages, or Make important decisions · Be aware of dizziness following anesthesia and while taking pain medication Call your doctor if 
· Excessive bleeding that does not stop after holding mild pressure over the area · Temperature of 101 degrees F or above · Redness,excessive swelling or bruising, and/or green or yellow, smelly discharge from incision · If nausea and vomiting continues Follow-Up Phone Calls · Call the office at [phone] to make your follow-up appointment Appointment date/time: 2 weeks after the surgery. Dr. Albert Shen cell phone number is [phone].  Please call me if you have any concerns or questions. Low-Fat Diet for Gallbladder Disease: Care Instructions Your Care Instructions When you eat, the gallbladder releases bile, which helps you digest the fat in food. If you have an inflamed gallbladder, this may cause pain. A low-fat diet may give your gallbladder a rest so you can start to heal. Your doctor and dietitian can help you make an eating plan that does not irritate your digestive system. Always talk with your doctor or dietitian before you make changes in your diet. Follow-up care is a key part of your treatment and safety. Be sure to make and go to all appointments, and call your doctor if you are having problems. It's also a good idea to know your test results and keep a list of the medicines you take. How can you care for yourself at home? · Eat many small meals and snacks each day instead of three large meals. · Choose lean meats. ¨ Eat no more than 5 to 6½ ounces of meat a day. ¨ Cut off all fat you can see. ¨ Eat chicken and turkey without the skin. ¨ Many types of fish, such as salmon, lake trout, tuna, and herring, provide healthy omega-3 fat. But, avoid fish canned in oil, such as sardines in olive oil. ¨ Bake, broil, or grill meats, poultry, or fish instead of frying them in butter or fat. · Drink or eat nonfat or low-fat milk, yogurt, cheese, or other milk products each day. ¨ Read the labels on cheeses, and choose those with less than 5 grams of fat an ounce. ¨ Try fat-free sour cream, cream cheese, or yogurt. ¨ Avoid cream soups and cream sauces on pasta. ¨ Eat low-fat ice cream, frozen yogurt, or sorbet. Avoid regular ice cream. 
· Eat whole-grain cereals, breads, crackers, rice, or pasta. Avoid high-fat foods such as croissants, scones, biscuits, waffles, doughnuts, muffins, granola, and high-fat breads.  
· Flavor your foods with herbs and spices (such as basil, tarragon, or mint), fat-free sauces, or lemon juice instead of butter. You can also use butter substitutes, fat-free mayonnaise, or fat-free dressing. · Try applesauce, prune puree, or mashed bananas to replace some or all of the fat when you bake. · Limit fats and oils, such as butter, margarine, mayonnaise, and salad dressing, to no more than 1 tablespoon a meal. 
· Avoid high-fat foods, such as: 
¨ Chocolate, whole milk, ice cream, and processed cheese. ¨ Fried or buttered foods. ¨ Sausage, salami, and brar. ¨ Cinnamon rolls, cakes, pies, cookies, and other pastries. ¨ Prepared snack foods, such as potato chips, nut and granola bars, and mixed nuts. ¨ Coconut and avocado. · Learn how to read food labels for serving sizes and ingredients. Fast-food and convenience-food meals often have lots of fat. Where can you learn more? Go to http://anupamGroup Commercecarissa.info/. Enter Y937 in the search box to learn more about \"Low-Fat Diet for Gallbladder Disease: Care Instructions. \" Current as of: May 12, 2017 Content Version: 11.4 © 0276-9948 Cellumen. Care instructions adapted under license by [employer] (which disclaims liability or warranty for this information). If you have questions about a medical condition or this instruction, always ask your healthcare professional. [place] [address] any warranty or liability for your use of this information. Narcotic-Analgesic/Acetaminophen (Percocet, Norco, Lorcet HD, Lortab 10/325) - (By mouth) Why this medicine is used:  
Relieves pain. Contact a nurse or doctor right away if you have: 
· Extreme weakness, shallow breathing, slow heartbeat · Severe confusion, lightheadedness, dizziness, fainting · Yellow skin or eyes, dark urine or pale stools · Severe constipation, severe stomach pain, nausea, vomiting, loss of appetite · Sweating or cold, clammy skin Common side effects: · Mild constipation, nausea, vomiting · Sleepiness, tiredness · Itching, rash © 2017 [address] Information is for End User's use only and may not be sold, redistributed or otherwise used for commercial purposes. DISCHARGE SUMMARY from Nurse PATIENT INSTRUCTIONS: 
 
 
F-face looks uneven A-arms unable to move or move unevenly S-speech slurred or non-existent T-time-call 911 as soon as signs and symptoms begin-DO NOT go Back to bed or wait to see if you get better-TIME IS BRAIN. Warning Signs of HEART ATTACK Call 911 if you have these symptoms: 
? Chest discomfort. Most heart attacks involve discomfort in the center of the chest that lasts more than a few minutes, or that goes away and comes back. It can feel like uncomfortable pressure, squeezing, fullness, or pain. ? Discomfort in other areas of the upper body. Symptoms can include pain or discomfort in one or both arms, the back, neck, jaw, or stomach. ? Shortness of breath with or without chest discomfort. ? Other signs may include breaking out in a cold sweat, nausea, or lightheadedness. Don't wait more than five minutes to call [address]! Fast action can save your life. Calling 911 is almost always the fastest way to get lifesaving treatment. Emergency Medical Services staff can begin treatment when they arrive  up to an hour sooner than if someone gets to the hospital by car. The discharge information has been reviewed with the patient and spouse. The patient and spouse verbalized understanding. Discharge medications reviewed with the patient and spouse and appropriate educational materials and side effects teaching were provided. ___________________________________________________________________________________________________________________________________ Introducing [facility] & HEALTH SERVICES! Dear Jamaal Angry: Thank you for requesting a Integrity Tracking account. Our records indicate that you already have an active Integrity Tracking account. You can access your account anytime at [email or web address] Did you know that you can access your hospital and ER discharge instructions at any time in Integrity Tracking? You can also review all of your test results from your hospital stay or ER visit. Additional Information If you have questions, please visit the Frequently Asked Questions section of the Integrity Tracking website at [email or web address] Remember, Integrity Tracking is NOT to be used for urgent needs. For medical emergencies, dial 911. Now available from your iPhone and Android! Providers Seen During Your Hospitalization Provider Specialty Primary office phone Ngoc Riddle MD Surgery [phone] Your Primary Care Physician (PCP) Primary Care Physician Office Phone Office Fax Guilherme Herrera, [address] [place] [address] [phone] You are allergic to the following Allergen Reactions Amoxicillin Other (comments) Seizure Bactrim (Sulfamethoprim) Hives Recent Documentation Height Weight BMI OB Status Smoking Status 1.6 m 73 kg 28.52 kg/m2 Hysterectomy Former Smoker Emergency Contacts Name Discharge Info Relation Home Work Mobile Raf Quiroz DISCHARGE CAREGIVER [3] Spouse [8] [phone] Patient Belongings The following personal items are in your possession at time of discharge: 
  Dental Appliances: None  Visual Aid: Glasses      Home Medications: None   Jewelry: None  Clothing: Undergarments, Pants, Footwear, Shirt, Jacket/Coat    Other Valuables: Cell Phone Please provide this summary of care documentation to your next provider. Signatures-by signing, you are acknowledging that this After Visit Summary has been reviewed with you and you have received a copy. Patient Signature:  ____________________________________________________________ Date:  ____________________________________________________________  
  
Nandini Phi Provider Signature:  ____________________________________________________________ Date:  ____________________________________________________________

## 2018-02-16 NOTE — PERIOP NOTES
Patient armband removed and shredded  Patient confirmed by two identifiers with discharge instructions prior too being provided to patient and spouse.

## 2018-02-28 ENCOUNTER — TELEPHONE (OUTPATIENT)
Dept: SURGERY | Age: 48
End: 2018-02-28

## 2018-02-28 NOTE — TELEPHONE ENCOUNTER
Returned patient's phone call regarding her c/o post-op pain s/p a robotic multi-port cholecystectomy done on 02/16/18. Patient stated for the past three days that she's been experiencing a lot of pain under her right costal area, especially when she's lying down on that area. She also relayed she has experienced some mild nausea, but denies any constipation or any other s/s of infection. Patient states she has not been taking the Percocet as prescribed postoperatively, but agreed to try to take some Ibuprofen for her pain as suggested. She also denied doing any heavy lifting or strenuous activity. Offered patient an appointment for tomorrow in the [place] [facility] location to see Dr. Charity Davila for an evaluation, however, patient declined the appointment due to it's location & stated she would just keep her already scheduled appointment with him on Monday, March 5, 2018 at the [facility] location & that she would give Dr. Charity Davila a call if her symptoms should worsen before that date.

## 2018-03-05 ENCOUNTER — OFFICE VISIT (OUTPATIENT)
Dept: SURGERY | Age: 48
End: 2018-03-05

## 2018-03-05 VITALS
RESPIRATION RATE: 18 BRPM | HEART RATE: 75 BPM | SYSTOLIC BLOOD PRESSURE: 122 MMHG | WEIGHT: 164 LBS | TEMPERATURE: 98.4 F | BODY MASS INDEX: 29.05 KG/M2 | DIASTOLIC BLOOD PRESSURE: 74 MMHG

## 2018-03-05 DIAGNOSIS — Z09 POSTOPERATIVE EXAMINATION: Primary | ICD-10-CM

## 2018-03-05 RX ORDER — CHOLECALCIFEROL (VITAMIN D3) 125 MCG
10 CAPSULE ORAL
COMMUNITY

## 2018-03-05 NOTE — PROGRESS NOTES
Patient seen and examined. She is doing well. Complaining of some dizziness and palpitation. Her abdomen is soft and non tender. Her wounds are healing well. Pathology showed chronic cholecystitis.   Plan:  Follow-up with primary care physician and cardiology  Follow up with me as needed

## 2018-05-25 ENCOUNTER — OFFICE VISIT (OUTPATIENT)
Dept: ORTHOPEDIC SURGERY | Facility: CLINIC | Age: 48
End: 2018-05-25

## 2018-05-25 VITALS
HEART RATE: 70 BPM | BODY MASS INDEX: 29.62 KG/M2 | DIASTOLIC BLOOD PRESSURE: 58 MMHG | SYSTOLIC BLOOD PRESSURE: 115 MMHG | HEIGHT: 63 IN | RESPIRATION RATE: 18 BRPM | TEMPERATURE: 98.2 F | WEIGHT: 167.2 LBS | OXYGEN SATURATION: 100 %

## 2018-05-25 DIAGNOSIS — M54.31 SCIATICA OF RIGHT SIDE: ICD-10-CM

## 2018-05-25 DIAGNOSIS — M54.41 CHRONIC RIGHT-SIDED LOW BACK PAIN WITH RIGHT-SIDED SCIATICA: Primary | ICD-10-CM

## 2018-05-25 DIAGNOSIS — M51.36 DDD (DEGENERATIVE DISC DISEASE), LUMBAR: ICD-10-CM

## 2018-05-25 DIAGNOSIS — M54.2 NECK PAIN: ICD-10-CM

## 2018-05-25 DIAGNOSIS — M79.7 FIBROMYALGIA: ICD-10-CM

## 2018-05-25 DIAGNOSIS — G89.29 HIP PAIN, CHRONIC, RIGHT: ICD-10-CM

## 2018-05-25 DIAGNOSIS — G89.29 CHRONIC RIGHT-SIDED LOW BACK PAIN WITH RIGHT-SIDED SCIATICA: Primary | ICD-10-CM

## 2018-05-25 DIAGNOSIS — M25.551 HIP PAIN, CHRONIC, RIGHT: ICD-10-CM

## 2018-05-25 DIAGNOSIS — B33.0 EPIDEMIC CERVICAL MYALGIA: ICD-10-CM

## 2018-05-25 RX ORDER — RANITIDINE 150 MG/1
150 CAPSULE ORAL AS NEEDED
COMMUNITY

## 2018-05-25 RX ORDER — FLUTICASONE PROPIONATE 50 MCG
SPRAY, SUSPENSION (ML) NASAL
Refills: 2 | COMMUNITY
Start: 2018-05-19

## 2018-05-25 RX ORDER — BETAMETHASONE SODIUM PHOSPHATE AND BETAMETHASONE ACETATE 3; 3 MG/ML; MG/ML
6 INJECTION, SUSPENSION INTRA-ARTICULAR; INTRALESIONAL; INTRAMUSCULAR; SOFT TISSUE ONCE
Qty: 0.5 ML | Refills: 0
Start: 2018-05-25 | End: 2018-05-25 | Stop reason: SDUPTHER

## 2018-05-25 RX ORDER — BUPIVACAINE HYDROCHLORIDE 2.5 MG/ML
4 INJECTION, SOLUTION EPIDURAL; INFILTRATION; INTRACAUDAL ONCE
Qty: 4 ML | Refills: 0
Start: 2018-05-25 | End: 2018-05-25 | Stop reason: SDUPTHER

## 2018-05-25 RX ORDER — BETAMETHASONE SODIUM PHOSPHATE AND BETAMETHASONE ACETATE 3; 3 MG/ML; MG/ML
6 INJECTION, SUSPENSION INTRA-ARTICULAR; INTRALESIONAL; INTRAMUSCULAR; SOFT TISSUE ONCE
Qty: 0.5 ML | Refills: 0
Start: 2018-05-25 | End: 2018-05-25

## 2018-05-25 RX ORDER — DIAZEPAM 5 MG/1
TABLET ORAL
Refills: 0 | COMMUNITY
Start: 2018-05-23

## 2018-05-25 RX ORDER — BUPIVACAINE HYDROCHLORIDE 2.5 MG/ML
4 INJECTION, SOLUTION EPIDURAL; INFILTRATION; INTRACAUDAL ONCE
Qty: 4 ML | Refills: 0
Start: 2018-05-25 | End: 2018-05-25

## 2018-05-25 NOTE — PATIENT INSTRUCTIONS
Learning About Degenerative Disc Disease  What is degenerative disc disease? Degenerative disc disease is not really a disease. It's a term used to describe the normal changes in your spinal discs as you age. Spinal discs are small, spongy discs that separate the bones (vertebrae) that make up the spine. The discs act as shock absorbers for the spine, so it can flex, bend, and twist.  Degenerative disc disease can take place in one or more places along the spine. It most often occurs in the discs in the lower back and the neck. What causes it? As we age, our spinal discs break down, or degenerate. This breakdown causes the symptoms of degenerative disc disease in some people. When the discs break down, they can lose fluid and dry out, and their outer layers can have tiny cracks or tears. This leads to less padding and less space between the bones in the spine. The body reacts to this by making bony growths on the spine called bone spurs. These spurs can press on the spinal nerve roots or spinal cord. This can cause pain and can affect how well the nerves work. What are the symptoms? Many people with degenerative disc disease have no pain. But others have severe pain or other symptoms that limit their activities. Some of the most common symptoms are:  · Pain in the back or neck. Where the pain occurs depends on which discs are affected. · Pain that gets worse when you move, such as bending over, reaching up, or twisting. · Pain that may occur in the rear end (buttocks), arm, or leg if a nerve is pinched. · Numbness or tingling in your arm or leg. How is it diagnosed? A doctor can often diagnose degenerative disc disease while doing a physical exam. If your exam shows no signs of a serious condition, imaging tests (such as an X-ray) aren't likely to help your doctor find the cause of your symptoms.   Sometimes degenerative disc disease is found when an X-ray is taken for another reason, such as an injury or other health problem. But even if the doctor finds degenerative disc disease, that doesn't always mean that you will have symptoms. How is it treated? There are several things you can do at home to manage pain from this problem. · To relieve pain, use ice or heat (whichever feels better) on the affected area. ¨ Put ice or a cold pack on the area for 10 to 20 minutes at a time. Put a thin cloth between the ice and your skin. ¨ Put a warm water bottle, a heating pad set on low, or a warm cloth on your back. Put a thin cloth between the heating pad and your skin. Do not go to sleep with a heating pad on your skin. · Ask your doctor if you can take acetaminophen (such as Tylenol) or nonsteroidal anti-inflammatory drugs, such as ibuprofen or naproxen. Your doctor can prescribe stronger medicines if needed. Be safe with medicines. Read and follow all instructions on the label. · Get some exercise every day. Exercise is one of the best ways to help your back feel better and stay better. It's best to start each exercise slowly. You may notice a little soreness, and that's okay. But if an exercise makes your pain worse, stop doing it. Here are things you can try:  ¨ Walking. It's the simplest and maybe the best activity for your back. It gets your blood moving and helps your muscles stay strong. ¨ Exercises that gently stretch and strengthen your stomach, back, and leg muscles. The stronger those muscles are, the better they're able to protect your back. If you have constant or severe pain in your back or spine, you may need other treatments, such as physical therapy. In some cases, your doctor may suggest surgery. Follow-up care is a key part of your treatment and safety. Be sure to make and go to all appointments, and call your doctor if you are having problems. It's also a good idea to know your test results and keep a list of the medicines you take. Where can you learn more?   Go to http://anupam-carissa.info/. Enter Z843 in the search box to learn more about \"Learning About Degenerative Disc Disease. \"  Current as of: March 21, 2017  Content Version: 11.4  © 1746-2249 Magiq. Care instructions adapted under license by [employer] (which disclaims liability or warranty for this information). If you have questions about a medical condition or this instruction, always ask your healthcare professional. [place] [address] any warranty or liability for your use of this information. Sciatica: Exercises  Your Care Instructions  Here are some examples of typical rehabilitation exercises for your condition. Start each exercise slowly. Ease off the exercise if you start to have pain. Your doctor or physical therapist will tell you when you can start these exercises and which ones will work best for you. When you are not being active, find a comfortable position for rest. Some people are comfortable on the floor or a medium-firm bed with a small pillow under their head and another under their knees. Some people prefer to lie on their side with a pillow between their knees. Don't stay in one position for too long. Take short walks (10 to 20 minutes) every 2 to 3 hours. Avoid slopes, hills, and stairs until you feel better. Walk only distances you can manage without pain, especially leg pain. How to do the exercises  Back stretches    1. Get down on your hands and knees on the floor. 2. Relax your head and allow it to droop. Round your back up toward the ceiling until you feel a nice stretch in your upper, middle, and lower back. Hold this stretch for as long as it feels comfortable, or about 15 to 30 seconds. 3. Return to the starting position with a flat back while you are on your hands and knees. 4. Let your back sway by pressing your stomach toward the floor. Lift your buttocks toward the ceiling.   5. Hold this position for 15 to 30 seconds. 6. Repeat 2 to 4 times. Follow-up care is a key part of your treatment and safety. Be sure to make and go to all appointments, and call your doctor if you are having problems. It's also a good idea to know your test results and keep a list of the medicines you take. Where can you learn more? Go to http://anupam-carissa.info/. Enter Z284 in the search box to learn more about \"Sciatica: Exercises. \"  Current as of: March 21, 2017  Content Version: 11.4  © 4180-4710 Edgar. Care instructions adapted under license by [employer] (which disclaims liability or warranty for this information). If you have questions about a medical condition or this instruction, always ask your healthcare professional. [address] any warranty or liability for your use of this information. Sciatica: Care Instructions  Your Care Instructions    Sciatica (say \"low-UM-fn-kuh\") is an irritation of one of the sciatic nerves, which come from the spinal cord in the lower back. The sciatic nerves and their branches extend down through the buttock to the foot. Sciatica can develop when an injured disc in the back presses against a spinal nerve root. Its main symptom is pain, numbness, or weakness that is often worse in the leg or foot than in the back. Sciatica often will improve and go away with time. Early treatment usually includes medicines and exercises to relieve pain. Follow-up care is a key part of your treatment and safety. Be sure to make and go to all appointments, and call your doctor if you are having problems. It's also a good idea to know your test results and keep a list of the medicines you take. How can you care for yourself at home? · Take pain medicines exactly as directed. ¨ If the doctor gave you a prescription medicine for pain, take it as prescribed.   ¨ If you are not taking a prescription pain medicine, ask your doctor if you can take an over-the-counter medicine. · Use heat or ice to relieve pain. ¨ To apply heat, put a warm water bottle, heating pad set on low, or warm cloth on your back. Do not go to sleep with a heating pad on your skin. ¨ To use ice, put ice or a cold pack on the area for 10 to 20 minutes at a time. Put a thin cloth between the ice and your skin. · Avoid sitting if possible, unless it feels better than standing. · Alternate lying down with short walks. Increase your walking distance as you are able to without making your symptoms worse. · Do not do anything that makes your symptoms worse. When should you call for help? Call 911 anytime you think you may need emergency care. For example, call if:  · You are unable to move a leg at all. Call your doctor now or seek immediate medical care if:  · You have new or worse symptoms in your legs or buttocks. Symptoms may include:  ¨ Numbness or tingling. ¨ Weakness. ¨ Pain. · You lose bladder or bowel control. Watch closely for changes in your health, and be sure to contact your doctor if:  · You are not getting better as expected. Where can you learn more? Go to http://anupam-carissa.info/. Enter [phone] in the search box to learn more about \"Sciatica: Care Instructions. \"  Current as of: March 21, 2017  Content Version: 11.4  © 3459-0241 Mob.ly. Care instructions adapted under license by cheerapp (which disclaims liability or warranty for this information). If you have questions about a medical condition or this instruction, always ask your healthcare professional. [place] [address] any warranty or liability for your use of this information.

## 2018-05-25 NOTE — PROGRESS NOTES
Patient: Angel Salter                MRN: [id number]       SSN: [id number]  YOB: 1970        AGE: 50 y.o. SEX: female    PCP: Georges Reyez MD  05/25/18    Chief Complaint   Patient presents with    Back Pain     Lower     HISTORY:  Angel Salter is a 50 y.o. female who is seen for back pain radiating down to her right leg. She states that her pain worsened since she sat on the couch on Monday. She noted a sudden pain which radiated into her right leg and her pelvis. She also reports neck pain. She reports pain and difficulty turning her head especially to the right. She sits and sometimes sleeps in a zerogravity massage chair that provides some relief. Abdominal/pelvic CT scan at [facility] 5/23/18 was negative for musculoskeletal problem. Pain Assessment  5/25/2018   Location of Pain Back   Location Modifiers (No Data)   Severity of Pain 8   Quality of Pain Throbbing; Alayne Ro; Aching   Duration of Pain Persistent   Frequency of Pain Constant   Aggravating Factors Bending;Walking;Standing   Limiting Behavior Yes   Relieving Factors Nothing   Result of Injury No     Occupation, etc:  Ms. Alex Muniz is no longer employed due to her fibromyalgia. She previously was working as a  for [employer]. She lives in [place] with her . She has three children, her  has two. She also has 5 grandchildren. Current weight is 167 pounds. She is 5'3\" tall. No results found for: HBA1C, HGBE8, GYL0EMDJ, CXX0VEZO, KAV5JXGB  Weight Metrics 5/25/2018 3/5/2018 2/16/2018 2/9/2018 1/30/2018 7/22/2011   Weight 167 lb 3.2 oz 164 lb 161 lb 163 lb 12.8 oz 160 lb 165 lb   BMI 29.62 kg/m2 29.05 kg/m2 28.52 kg/m2 28.56 kg/m2 27.9 kg/m2 28.77 kg/m2       There is no problem list on file for this patient. REVIEW OF SYSTEMS: All Below are Negative except: See HPI   Constitutional: negative for fever, chills, and weight loss.    Cardiovascular: negative for chest pain, claudication, leg swelling, SOB, DAUGHERTY   Gastrointestinal: Negative for pain, N/V/C/D, Blood in stool or urine, dysuria,  hematuria, incontinence, pelvic pain. Musculoskeletal: See HPI   Neurological: Negative for dizziness and weakness. Negative for headaches, Visual changes, confusion, seizures   Phychiatric/Behavioral: Negative for depression, memory loss, substance  abuse. Extremities: Negative for hair changes, rash, or skin lesion changes. Hematologic: Negative for bleeding problems, bruising, pallor or swollen lymph  nodes   Peripheral Vascular: No calf pain, no circulation deficits. Social History     Social History    Marital status:      Spouse name: N/A    Number of children: N/A    Years of education: N/A     Occupational History    Not on file. Social History Main Topics    Smoking status: Former Smoker     Quit date: 7/22/2009    Smokeless tobacco: Never Used    Alcohol use 1.8 oz/week     1 Glasses of wine, 1 Cans of beer, 1 Shots of liquor per week      Comment: one drink per day    Drug use: Not on file    Sexual activity: Not on file     Other Topics Concern    Not on file     Social History Narrative      Allergies   Allergen Reactions    Amoxicillin Other (comments)     Seizure      Bactrim [Sulfamethoprim] Hives      Current Outpatient Prescriptions   Medication Sig    raNITIdine hcl 150 mg capsule Take 150 mg by mouth two (2) times a day.  diazePAM (VALIUM) 5 mg tablet TAKE 1 TABLET BY MOUTH EVERY 6 HOURS AS NEEDED FOR MUSLCE SPASMS    fluticasone (FLONASE) 50 mcg/actuation nasal spray USE 1 SPRAY IN EACH NOSTRIL SPRAY TWO TIMES DAILY AS NEEDED    melatonin tab tablet Take 10 mg by mouth nightly.  oxyCODONE-acetaminophen (PERCOCET) 5-325 mg per tablet Take 1 Tab by mouth every four (4) hours as needed for Pain. Max Daily Amount: 6 Tabs.     FLUoxetine (PROZAC) 40 mg capsule TAKE ONE CAPSULE BY MOUTH DAILY    pantoprazole (PROTONIX) 40 mg tablet TAKE 1 TABLET BY MOUTH 30 MINUTES BEFORE BREAKFAST    LEVOTHYROXINE SODIUM (LEVOTHYROXINE PO) Take  by mouth.  cholecalciferol, vitamin d3, (VITAMIN D3) 1,000 unit tablet Take  by mouth daily. No current facility-administered medications for this visit. PHYSICAL EXAMINATION:  Visit Vitals    /58    Pulse 70    Temp 98.2 °F (36.8 °C) (Oral)    Resp 18    Ht 5' 3\" (1.6 m)    Wt 167 lb 3.2 oz (75.8 kg)    SpO2 100%    BMI 29.62 kg/m2      ORTHO EXAMINATION:  Examination Lumbar Thoracic   Skin Intact Intact   Tenderness + -   Tightness + -   Lordosis Normal N/A   Kyphosis N/A Normal   Scoliosis - -   Flexion Fingertips to knees N/A   Extension 10 N/A   Knee reflexes Normal N/A   Ankle reflexes Normal N/A   Straight leg raise - N/A   Calf tenderness - N/A     Examination Right hip Left hip   Skin Intact Intact   External Rotation ROM 15 20   Internal Rotation ROM 10 10   Trochanteric tenderness - -   Hip flexion contracture - -   Antalgic gait + -   Trendelenberg sign - -   Lumbar tenderness + -   Straight leg raise - -   Calf tenderness - -   Neurovascular Intact Intact     Examination Right knee Left knee   Skin Intact Intact   Range of motion 120-0 120-0   Effusion - -   Medial joint line tenderness + +   Lateral joint line tenderness - -   Popliteal tenderness - -   Osteophytes palpable - -   Benjamins - -   Patella crepitus - -   Anterior drawer - -   Lateral laxity - -   Medial laxity - -   Varus deformity - -   Valgus deformity - -   Pretibial edema - -   Calf tenderness - -   She is having difficulty standing from the chair  Moving slowly  Hunched at the waist      PROCEDURE:  After discussing treatment options, patient's right paracervical and paralumbar regions were injected with 4 cc Marcaine and 1/2 cc Celestone.     Chart reviewed for the following:   Daniel Ledezma MD, have reviewed the History, Physical and updated the Allergic reactions for Gloria Rosas 111 performed immediately prior to start of procedure:  Yung Contreras MD, have performed the following reviews on [address] prior to the start of the procedure:            * Patient was identified by name and date of birth   * Agreement on procedure being performed was verified  * Risks and Benefits explained to the patient  * Procedure site verified and marked as necessary  * Patient was positioned for comfort  * Consent was obtained     Time: 9:10 AM     Date of procedure: 5/25/2018    Procedure performed by:  Lucy Lantigua MD    Ms. Quiroz tolerated the procedure well with no complications. RADIOGRAPHS:  XR RIGHT HIP 05/25/2018  IMPRESSION:  AP pelvis and two views - No fractures, no joint space narrowing, no osteophytes present. Tonnis grade 1 small lateral osteophytes  XR   IMPRESSION:  Two views - no fractures, L12 and L5-S1 disc space narrowing, anterior vertebral osteophytes present, no spondylolisthesis. IMPRESSION:      ICD-10-CM ICD-9-CM    1. Chronic right-sided low back pain with right-sided sciatica M54.41 724.2 AMB POC XRAY, SPINE, LUMBOSACRAL; 2 O    G89.29 724.3 REFERRAL TO PHYSICAL THERAPY     338.29 DISCONTINUED: betamethasone (CELESTONE SOLUSPAN) 6 mg/mL injection      DISCONTINUED: betamethasone (CELESTONE SOLUSPAN) 6 mg/mL injection      DISCONTINUED: bupivacaine, PF, (MARCAINE, PF,) 0.25 % (2.5 mg/mL) injection      CANCELED: BETAMETHASONE ACETATE & SODIUM PHOSPHATE INJECTION 3 MG EA.      CANCELED: BETAMETHASONE ACETATE & SODIUM PHOSPHATE INJECTION 3 MG EA.      CANCELED: THER/PROPH/DIAG INJECTION, SUBCUT/IM   2.  Hip pain, chronic, right M25.551 719.45 POC XRAY, PELVIS; 1 OR 2 VIEWS    G89.29 338.29 REFERRAL TO PHYSICAL THERAPY      DISCONTINUED: betamethasone (CELESTONE SOLUSPAN) 6 mg/mL injection      DISCONTINUED: betamethasone (CELESTONE SOLUSPAN) 6 mg/mL injection      DISCONTINUED: bupivacaine, PF, (MARCAINE, PF,) 0.25 % (2.5 mg/mL) injection      CANCELED: BETAMETHASONE ACETATE & SODIUM PHOSPHATE INJECTION 3 MG EA.      CANCELED: BETAMETHASONE ACETATE & SODIUM PHOSPHATE INJECTION 3 MG EA.      CANCELED: THER/PROPH/DIAG INJECTION, SUBCUT/IM   3. Sciatica of right side M54.31 724.3 REFERRAL TO PHYSICAL THERAPY      DISCONTINUED: betamethasone (CELESTONE SOLUSPAN) 6 mg/mL injection      DISCONTINUED: betamethasone (CELESTONE SOLUSPAN) 6 mg/mL injection      DISCONTINUED: bupivacaine, PF, (MARCAINE, PF,) 0.25 % (2.5 mg/mL) injection      CANCELED: BETAMETHASONE ACETATE & SODIUM PHOSPHATE INJECTION 3 MG EA.      CANCELED: BETAMETHASONE ACETATE & SODIUM PHOSPHATE INJECTION 3 MG EA.      CANCELED: THER/PROPH/DIAG INJECTION, SUBCUT/IM   4. Fibromyalgia M79.7 729.1 REFERRAL TO PHYSICAL THERAPY   5. Neck pain M54.2 723.1 REFERRAL TO PHYSICAL THERAPY      DISCONTINUED: betamethasone (CELESTONE SOLUSPAN) 6 mg/mL injection      DISCONTINUED: betamethasone (CELESTONE SOLUSPAN) 6 mg/mL injection      DISCONTINUED: bupivacaine, PF, (MARCAINE, PF,) 0.25 % (2.5 mg/mL) injection      CANCELED: BETAMETHASONE ACETATE & SODIUM PHOSPHATE INJECTION 3 MG EA.      CANCELED: BETAMETHASONE ACETATE & SODIUM PHOSPHATE INJECTION 3 MG EA.      CANCELED: THER/PROPH/DIAG INJECTION, SUBCUT/IM   6. DDD (degenerative disc disease), lumbar M51.36 722.52 REFERRAL TO PHYSICAL THERAPY      DISCONTINUED: betamethasone (CELESTONE SOLUSPAN) 6 mg/mL injection      DISCONTINUED: betamethasone (CELESTONE SOLUSPAN) 6 mg/mL injection      DISCONTINUED: bupivacaine, PF, (MARCAINE, PF,) 0.25 % (2.5 mg/mL) injection      DISCONTINUED: betamethasone (CELESTONE SOLUSPAN) 6 mg/mL injection      DISCONTINUED: betamethasone (CELESTONE SOLUSPAN) 6 mg/mL injection      DISCONTINUED: bupivacaine, PF, (MARCAINE, PF,) 0.25 % (2.5 mg/mL) injection      CANCELED: BETAMETHASONE ACETATE & SODIUM PHOSPHATE INJECTION 3 MG EA.      CANCELED: BETAMETHASONE ACETATE & SODIUM PHOSPHATE INJECTION 3 MG EA.      CANCELED: THER/PROPH/DIAG INJECTION, SUBCUT/IM      CANCELED: BETAMETHASONE ACETATE & SODIUM PHOSPHATE INJECTION 3 MG EA.      CANCELED: BETAMETHASONE ACETATE & SODIUM PHOSPHATE INJECTION 3 MG EA.      CANCELED: THER/PROPH/DIAG INJECTION, SUBCUT/IM   7. cervical myalgia B33.0 078.89 REFERRAL TO PHYSICAL THERAPY      DISCONTINUED: betamethasone (CELESTONE SOLUSPAN) 6 mg/mL injection      DISCONTINUED: betamethasone (CELESTONE SOLUSPAN) 6 mg/mL injection      DISCONTINUED: bupivacaine, PF, (MARCAINE, PF,) 0.25 % (2.5 mg/mL) injection      CANCELED: BETAMETHASONE ACETATE & SODIUM PHOSPHATE INJECTION 3 MG EA.      CANCELED: BETAMETHASONE ACETATE & SODIUM PHOSPHATE INJECTION 3 MG EA.      CANCELED: THER/PROPH/DIAG INJECTION, SUBCUT/IM     PLAN:  After discussing treatment options, patient's right paracervical and paralumbar regions were injected with 4 cc Marcaine and 1/2 cc Celestone. Start PT. Followup at the spine center.      Scribed by Mian Varma [address]) as dictated by Eusebio Wellington MD

## 2018-06-01 ENCOUNTER — HOSPITAL ENCOUNTER (OUTPATIENT)
Dept: PHYSICAL THERAPY | Age: 48
Discharge: HOME | End: 2018-06-01
Payer: COMMERCIAL

## 2018-06-01 PROCEDURE — 97161 PT EVAL LOW COMPLEX 20 MIN: CPT

## 2018-06-01 PROCEDURE — 97110 THERAPEUTIC EXERCISES: CPT

## 2018-06-01 NOTE — PROGRESS NOTES
[facility] [place] [id number]  [address], [address] [phone] fax    Plan of Care/ Statement of Necessity for Physical Therapy Services           Patient name: Dominique Nina Start of Care: 2018   Referral source: Jitendra Vo MD : 1970    Medical Diagnosis: Neck pain [M54.2]  Low back pain [M54.5]   Onset Date:18    Treatment Diagnosis: neck & back pain   Prior Hospitalization: see medical history Provider#: [id number]   Medications: Verified on Patient summary List    Comorbidities: fibromyalgia, thyroid issues   Prior Level of Function: currently not working. Has been denied disability X 2 attempts. Lives in 2 story home. Functionally independent but slow/modified with ADLs    The Plan of Care and following information is based on the information from the initial evaluation. Assessment/ key information: Patient is a 50 y. o.female presenting with Neck pain [M54.2]  Low back pain [M54.5]. Ms. Lyla Sierra arrives to PT evaluation with c/o worsening neck & back pain over the past few months. MD has recommended aquatic therapy, however patient is concerned that her neck pain will not be addressed. Discussed having her try land-bands PT initially to see if she is able to tolerate. Should she have an increase or no change in pain, she would then try aquatic PT. PT will f/u with MD to make sure this is cleared/appropriate before scheduling patient for follow up visits. neck pain appears muscular in origin without radicular signs. Back pain appears consistent with mechanical and soft tissue restrictions, with occasional radicular symptoms. She reports relief of extreme lumbar pain with cortisone injection, but worsening headaches following cervical injection. We will work to address soft tissue restrictions with emphasis on gentle movement and pain control.  Patient will benefit from skilled PT services to address deficits and facilitate return to premorbid activity level and promote improved quality of life. Evaluation Complexity History MEDIUM  Complexity : 1-2 comorbidities / personal factors will impact the outcome/ POC ; Examination MEDIUM Complexity : 3 Standardized tests and measures addressing body structure, function, activity limitation and / or participation in recreation  ;Presentation MEDIUM Complexity : Evolving with changing characteristics  ; Clinical Decision Making MEDIUM Complexity : FOTO score of 26-74  Overall Complexity Rating: MEDIUM  Problem List: pain affecting function, decrease ROM, decrease strength, edema affecting function, impaired gait/ balance, decrease ADL/ functional abilitiies, decrease activity tolerance, decrease flexibility/ joint mobility and decrease transfer abilities   Treatment Plan may include any combination of the following: Therapeutic exercise, Therapeutic activities, Neuromuscular re-education, Physical agent/modality, Gait/balance training, Manual therapy, Aquatic therapy, Patient education, Self Care training, Functional mobility training, Home safety training and Stair training  Patient / Family readiness to learn indicated by: asking questions, trying to perform skills and interest  Persons(s) to be included in education: patient  Barriers to Learning/Limitations: None  Patient Goal (s): to get the pain down.   Patient Self Reported Health Status: fair  Rehabilitation Potential: fair  Short Term Goals: To be accomplished in 1 weeks:  1. Establish HEP for ROM & Strengthening. Long Term Goals: To be accomplished in 4 weeks:  *1. Patient will be independent with HEP for ROM & Strengthening. Eval Status:n/a  2. Pt will increase cervical rotation bilaterally  By 10 degrees to increase ease with ADLs. Eval Status:right rotation: 40 deg, left rotation: 35 deg  3. Pt will increase FOTO score to 71/66 points to demostrate improved function. Eval Status: FOTO: 59/43  4.  Pt will report ability to walk >30 min without pain to improve functional mobility. Eval Status:limited by pain. Frequency / Duration: Patient to be seen 2 times per week for 4 weeks. Patient/ Caregiver education and instruction: Diagnosis, prognosis, self care, activity modification and exercises   [x]  Plan of care has been reviewed with JOSE Cifuentes, PT 6/1/2018 12:03 PM    ________________________________________________________________________    I certify that the above Therapy Services are being furnished while the patient is under my care. I agree with the treatment plan and certify that this therapy is necessary.     Physician's Signature:____________________  Date:____________Time: _________    Please sign and return to [facility] [place] [address]  [address]   [address] [facility], [address] [phone] fax

## 2018-06-01 NOTE — PROGRESS NOTES
PT DAILY TREATMENT NOTE 8-14    Patient Name: Ayush Harvey  Date:2018  : 1970  [x]  Patient  Verified  Payor: [employer] [place] / Plan: [address] [place] [address] / Product Type: PPO /    In time:1100  Out time:1155  Total Treatment Time (min): 55  Visit #: 1 of 8    Treatment Area: Neck pain [M54.2]  Low back pain [M54.5]    SUBJECTIVE  Pain Level (0-10 scale): 5  Any medication changes, allergies to medications, adverse drug reactions, diagnosis change, or new procedure performed?: [x] No    [] Yes (see summary sheet for update)  Subjective functional status/changes:   [x] See Eval form in paper chart     OBJECTIVE      45 min [x]Eval                  []Re-Eval         10 min Therapeutic Exercise:  [x] See flow sheet :HEP   Rationale: increase ROM, increase strength, improve coordination, improve balance and increase proprioception to improve the patients ability to perform ADLs. With   [] TE   [] TA   [] neuro   [] other: Patient Education: [x] Review HEP    [] Progressed/Changed HEP based on:   [] positioning   [] body mechanics   [] transfers   [] heat/ice application    [] other:           Pain Level (0-10 scale) post treatment: 5    ASSESSMENT:   [x]  See Evaluation          Goals:  Short Term Goals: To be accomplished in 1 weeks:  1. Establish HEP for ROM & Strengthening. Long Term Goals: To be accomplished in 4 weeks:  *1. Patient will be independent with HEP for ROM & Strengthening. Eval Status:n/a  2. Pt will increase cervical rotation bilaterally  By 10 degrees to increase ease with ADLs. Eval Status:right rotation: 40 deg, left rotation: 35 deg  3. Pt will increase FOTO score to 71/66 points to demostrate improved function. Eval Status: FOTO: 59/43  4. Pt will report ability to walk >30 min without pain to improve functional mobility. Eval Status:limited by pain.      PLAN      [x]  Continue plan of care           Barrera Hodges, PT 2018  12:07 PM

## 2018-06-06 ENCOUNTER — HOSPITAL ENCOUNTER (OUTPATIENT)
Dept: PHYSICAL THERAPY | Age: 48
Discharge: HOME | End: 2018-06-06
Payer: COMMERCIAL

## 2018-06-06 PROCEDURE — 97110 THERAPEUTIC EXERCISES: CPT

## 2018-06-06 PROCEDURE — 97112 NEUROMUSCULAR REEDUCATION: CPT

## 2018-06-06 NOTE — PROGRESS NOTES
PT DAILY TREATMENT NOTE     Patient Name: Wai Richardson  Date:2018  : 1970  [x]  Patient  Verified  Payor: [employer] / Plan: [address] [place] [address] Avenue / Product Type: PPO /    In time:1134  Out time:1212  Total Treatment Time (min): 38  Total Timed Codes (min): 28  1:1 Treatment Times (min): 28  Visit #: 2 of 8    Treatment Area: Neck pain [M54.2]  Low back pain [M54.5]    SUBJECTIVE  Pain Level (0-10 scale): 5  Any medication changes, allergies to medications, adverse drug reactions, diagnosis change, or new procedure performed?: [x] No    [] Yes (see summary sheet for update)  Subjective functional status/changes:   [] No changes reported  \"I always hurt. \"    OBJECTIVE    Modality rationale: decrease pain to improve the patients ability to improve mobility and positional tolerance   Min Type Additional Details    [] Estim:  []Unatt       []IFC  []Premod                        []Other:  []w/ice   []w/heat  Position:  Location:    [] Estim: []Att    []TENS instruct  []NMES                    []Other:  []w/US   []w/ice   []w/heat  Position:  Location:    []  Traction: [] Cervical       []Lumbar                       [] Prone          []Supine                       []Intermittent   []Continuous Lbs:  [] before manual  [] after manual    []  Ultrasound: []Continuous   [] Pulsed                           []1MHz   []3MHz W/cm2:  Location:    []  Iontophoresis with dexamethasone         Location: [] Take home patch   [] In clinic   10 []  Ice     [x]  heat  []  Ice massage  []  Laser   []  Anodyne Position: seated   Location: neck and back    []  Laser with stim  []  Other:  Position:  Location:    []  Vasopneumatic Device Pressure:       [] lo [] med [] hi   Temperature: [] lo [] med [] hi   [x] Skin assessment post-treatment:  [x]intact []redness- no adverse reaction    []redness - adverse reaction:     10 min Therapeutic Exercise:  [x] See flow sheet :   Rationale: increase ROM and increase strength to improve the patients ability to perform ADLs    18 min Neuromuscular Re-education:  [x]  See flow sheet : core stabilization activities   Rationale: increase ROM, increase strength, improve coordination, improve balance and increase proprioception  to improve the patients ability to improve mobility and core stability         With   [x] TE   [] TA   [x] neuro   [] other: Patient Education: [x] Review HEP    [] Progressed/Changed HEP based on:   [x] positioning   [x] body mechanics   [] transfers   [] heat/ice application    [] other:      Other Objective/Functional Measures:      Pain Level (0-10 scale) post treatment: 3    ASSESSMENT/Changes in Function: Initiated POC. Pt reports improvements since beginning her HEP. Challenged with diaphragmatic breathing and needed cuing to improve TA draw. Patient will continue to benefit from skilled PT services to modify and progress therapeutic interventions, address functional mobility deficits, address ROM deficits, address strength deficits, analyze and address soft tissue restrictions, analyze and cue movement patterns, analyze and modify body mechanics/ergonomics, assess and modify postural abnormalities, address imbalance/dizziness and instruct in home and community integration to attain remaining goals. [x]  See Plan of Care  []  See progress note/recertification  []  See Discharge Summary         Progress towards goals / Updated goals:  Short Term Goals: To be accomplished in 1 weeks:  1. Establish HEP for ROM & Strengthening. MET   Long Term Goals: To be accomplished in 4 weeks:  1. Patient will be independent with HEP for ROM & Strengthening. Eval Status:n/a   Reports initial compliance   2. Pt will increase cervical rotation bilaterally  By 10 degrees to increase ease with ADLs. Eval Status:right rotation: 40 deg, left rotation: 35 deg  3. Pt will increase FOTO score to 71/66 points to demostrate improved function.    Eval Status: FOTO: 59/43  4. Pt will report ability to walk >30 min without pain to improve functional mobility. Eval Status:limited by pain.     PLAN  []  Upgrade activities as tolerated     [x]  Continue plan of care  []  Update interventions per flow sheet       []  Discharge due to:_  []  Other:_      Tere Gamez PTA, [facility] 6/6/2018  12:13 PM    Future Appointments  Date Time Provider Shabbir Verdugoi   6/8/2018 10:30 AM Tere Gamez PTA [facility]   6/13/2018 10:00 AM Royal Drake MD [facility]

## 2018-06-08 ENCOUNTER — HOSPITAL ENCOUNTER (OUTPATIENT)
Dept: PHYSICAL THERAPY | Age: 48
Discharge: HOME | End: 2018-06-08
Payer: COMMERCIAL

## 2018-06-08 PROCEDURE — 97112 NEUROMUSCULAR REEDUCATION: CPT

## 2018-06-08 PROCEDURE — 97110 THERAPEUTIC EXERCISES: CPT

## 2018-06-08 NOTE — PROGRESS NOTES
PT DAILY TREATMENT NOTE - [facility]     Patient Name: Nerissa Blackburn  Date:2018  : 1970  [x]  Patient  Verified  Payor: BLUE CROSS / Plan: [address] [place] [address] / Product Type: PPO /    In time:1028  Out time:1104  Total Treatment Time (min): 36  Total Timed Codes (min): 26  1:1 Treatment Time ( only): 26   Visit #: 3 of 8    Treatment Area: Neck pain [M54.2]  Low back pain [M54.5]    SUBJECTIVE  Pain Level (0-10 scale): 5  Any medication changes, allergies to medications, adverse drug reactions, diagnosis change, or new procedure performed?: [x] No    [] Yes (see summary sheet for update)  Subjective functional status/changes:   [] No changes reported  \"I felt ok after last time. \"    OBJECTIVE    Modality rationale: decrease pain to improve the patients ability to improve mobility and positional tolerance   Min Type Additional Details    [] Estim:  []Unatt       []IFC  []Premod                        []Other:  []w/ice   []w/heat  Position:  Location:    [] Estim: []Att    []TENS instruct  []NMES                    []Other:  []w/US   []w/ice   []w/heat  Position:  Location:    []  Traction: [] Cervical       []Lumbar                       [] Prone          []Supine                       []Intermittent   []Continuous Lbs:  [] before manual  [] after manual    []  Ultrasound: []Continuous   [] Pulsed                           []1MHz   []3MHz W/cm2:  Location:    []  Iontophoresis with dexamethasone         Location: [] Take home patch   [] In clinic   10 []  Ice     [x]  heat  []  Ice massage  []  Laser   []  Anodyne Position: seated   Location: lower back and neck    []  Laser with stim  []  Other:  Position:  Location:    []  Vasopneumatic Device Pressure:       [] lo [] med [] hi   Temperature: [] lo [] med [] hi   [x] Skin assessment post-treatment:  [x]intact []redness- no adverse reaction    []redness - adverse reaction:     10 min Therapeutic Exercise:  [x] See flow sheet :   Rationale: increase ROM and increase strength to improve the patients ability to perform ADLs    16 min Neuromuscular Re-education:  [x]  See flow sheet : core stabilization activities   Rationale: increase ROM, increase strength, improve coordination, improve balance and increase proprioception  to improve the patients ability to improve mobility and core stability        With   [x] TE   [] TA   [x] neuro   [] other: Patient Education: [x] Review HEP    [] Progressed/Changed HEP based on:   [x] positioning   [x] body mechanics   [] transfers   [] heat/ice application    [] other:      Other Objective/Functional Measures:      Pain Level (0-10 scale) post treatment: 2    ASSESSMENT/Changes in Function: Pt demonstrated improved control with TA draw today compared to her last visit. Still needs some cuing to improve diaphragmatic breathing. Patient will continue to benefit from skilled PT services to modify and progress therapeutic interventions, address functional mobility deficits, address ROM deficits, address strength deficits, analyze and address soft tissue restrictions, analyze and cue movement patterns, analyze and modify body mechanics/ergonomics, assess and modify postural abnormalities, address imbalance/dizziness and instruct in home and community integration to attain remaining goals. [x]  See Plan of Care  []  See progress note/recertification  []  See Discharge Summary         Progress towards goals / Updated goals:  Short Term Goals: To be accomplished in 1 weeks:  1. Establish HEP for ROM & Strengthening. New Nila be accomplished in 4 weeks:  1. Patient will be independent with HEP for ROM & Strengthening. Eval Status:n/a   Reports initial compliance   2. Pt will increase cervical rotation bilaterally  By 10 degrees to increase ease with ADLs. Eval Status:right rotation: 40 deg, left rotation: 35 deg   Measure at later visit  3.  Pt will increase FOTO score to 71/66 points to demostrate improved function. Eval Status: FOTO: 59/43   Assess NV  4. Pt will report ability to walk >30 min without pain to improve functional mobility. Eval Status:limited by pain.    No change to note    PLAN  []  Upgrade activities as tolerated     [x]  Continue plan of care  []  Update interventions per flow sheet       []  Discharge due to:_  []  Other:_      Wilmer Viera, PTA, CSCS 6/8/2018  11:34 AM    Future Appointments  Date Time Provider Shbabir Ewing   6/13/2018 10:00 AM Alonzo Gottron, MD [facility]

## 2018-06-11 ENCOUNTER — HOSPITAL ENCOUNTER (OUTPATIENT)
Dept: PHYSICAL THERAPY | Age: 48
Discharge: HOME | End: 2018-06-11
Payer: COMMERCIAL

## 2018-06-11 PROCEDURE — 97140 MANUAL THERAPY 1/> REGIONS: CPT

## 2018-06-11 PROCEDURE — 97110 THERAPEUTIC EXERCISES: CPT

## 2018-06-11 NOTE — PROGRESS NOTES
PT DAILY TREATMENT NOTE     Patient Name: Marlene Rey  Date:2018  : 1970  [x]  Patient  Verified  Payor: [employer]YA / Plan: [address] [place] [address] / Product Type: PPO /    In time:9:25  Out time:10:15  Total Treatment Time (min): 50  Visit #: 4 of 8    Treatment Area: Neck pain [M54.2]  Low back pain [M54.5]    SUBJECTIVE  Pain Level (0-10 scale): neck 6, back 2/10  Any medication changes, allergies to medications, adverse drug reactions, diagnosis change, or new procedure performed?: [x] No    [] Yes (see summary sheet for update)  Subjective functional status/changes:   [] No changes reported  Pt reports more discomfort in her neck than her back. OBJECTIVE    Modality rationale: decrease pain and increase tissue extensibility to improve the patients ability to perform ADLs with less difficulty.    Min Type Additional Details    [] Estim:  []Unatt       []IFC  []Premod                        []Other:  []w/ice   []w/heat  Position:  Location:    [] Estim: []Att    []TENS instruct  []NMES                    []Other:  []w/US   []w/ice   []w/heat  Position:  Location:    []  Traction: [] Cervical       []Lumbar                       [] Prone          []Supine                       []Intermittent   []Continuous Lbs:  [] before manual  [] after manual    []  Ultrasound: []Continuous   [] Pulsed                           []1MHz   []3MHz W/cm2:  Location:    []  Iontophoresis with dexamethasone         Location: [] Take home patch   [] In clinic   10 []  Ice     [x]  heat  []  Ice massage  []  Laser   []  Anodyne Position:supine  Location:neck/back    []  Laser with stim  []  Other:  Position:  Location:    []  Vasopneumatic Device Pressure:       [] lo [] med [] hi   Temperature: [] lo [] med [] hi   [x] Skin assessment post-treatment:  [x]intact [x]redness- no adverse reaction    []redness - adverse reaction:     30 min Therapeutic Exercise:  [] See flow sheet :   Rationale: increase ROM and increase strength to improve the patients ability to perform ADLs with less difficulty. 10 min Manual Therapy:  SOR, manual traction to C/S, TPR to B UTS. MET to correct right rotated vertebrae at C4. Rationale: decrease pain, increase ROM and increase tissue extensibility to perform ADls with less difficulty. With   [] TE   [] TA   [] neuro   [] other: Patient Education: [x] Review HEP    [] Progressed/Changed HEP based on:   [] positioning   [] body mechanics   [] transfers   [] heat/ice application    [] other:      Other Objective/Functional Measures:      Pain Level (0-10 scale) post treatment: 2/10    ASSESSMENT/Changes in Function: Added manual due to pt. Reporting increased neck pain today. Pt reported decreased neck pain after therapy today. Patient will continue to benefit from skilled PT services to modify and progress therapeutic interventions, address functional mobility deficits, address ROM deficits, address strength deficits, analyze and address soft tissue restrictions and analyze and cue movement patterns to attain remaining goals. []  See Plan of Care  []  See progress note/recertification  []  See Discharge Summary         Progress towards goals / Updated goals:    Short Term Goals: To be accomplished in 1 weeks:  1. Establish HEP for ROM & Strengthening. New Nila be accomplished in 4 weeks:  1. Patient will be independent with HEP for ROM & Strengthening. Eval Status:n/a                        Reports initial compliance   2. Pt will increase cervical rotation bilaterally  By 10 degrees to increase ease with ADLs. Eval Status:right rotation: 40 deg, left rotation: 35 deg                        Measure at later visit  3. Pt will increase FOTO score to 71/66 points to demostrate improved function. Eval Status: FOTO: 59/43                        Assess NV  4.  Pt will report ability to walk >30 min without pain to improve functional mobility. Eval Status:limited by pain.                         No change to note  PLAN  [x]  Upgrade activities as tolerated     [x]  Continue plan of care  []  Update interventions per flow sheet       []  Discharge due to:_  []  Other:_      Lolis Lindsay PTA 6/11/2018  10:33 AM    Future Appointments  Date Time Provider Shabbir Ewing   6/13/2018 10:00 AM MD Buster Alonso   6/15/2018 10:00 AM Petra Lozada PTA [facility]   6/18/2018 11:00 AM Petra Lozada PTA [facility]   6/22/2018 10:30 AM GWEN Godinezs

## 2018-06-13 ENCOUNTER — OFFICE VISIT (OUTPATIENT)
Dept: ORTHOPEDIC SURGERY | Age: 48
End: 2018-06-13

## 2018-06-13 VITALS
WEIGHT: 168 LBS | BODY MASS INDEX: 29.77 KG/M2 | HEIGHT: 63 IN | HEART RATE: 98 BPM | SYSTOLIC BLOOD PRESSURE: 109 MMHG | DIASTOLIC BLOOD PRESSURE: 87 MMHG

## 2018-06-13 DIAGNOSIS — M51.36 DDD (DEGENERATIVE DISC DISEASE), LUMBAR: ICD-10-CM

## 2018-06-13 DIAGNOSIS — M47.817 LUMBOSACRAL SPONDYLOSIS WITHOUT MYELOPATHY: Primary | ICD-10-CM

## 2018-06-13 DIAGNOSIS — M54.16 LUMBAR NEURITIS: ICD-10-CM

## 2018-06-13 RX ORDER — METHYLPREDNISOLONE 4 MG/1
TABLET ORAL
Qty: 1 DOSE PACK | Refills: 0 | Status: SHIPPED | OUTPATIENT
Start: 2018-06-13

## 2018-06-13 RX ORDER — IBUPROFEN 800 MG/1
800 TABLET ORAL
Qty: 45 TAB | Refills: 0 | Status: SHIPPED | OUTPATIENT
Start: 2018-06-13

## 2018-06-13 NOTE — MR AVS SNAPSHOT
[address] [facility] 
 
 
 [address] [facility] 
[phone] Patient: Dominique Nina MRN: [id number] Visit Information Date & Time Provider Department Dept. Phone Encounter #  
 6/13/2018 10:00 AM Jonathon Marquez MD [address] [facility] [place] [phone] [id number] Follow-up Instructions Return in about 4 weeks (around 7/11/2018). Upcoming Health Maintenance Date Due DTaP/Tdap/Td series (1 - Tdap) 3/24/1991 PAP AKA CERVICAL CYTOLOGY 3/24/1991 Influenza Age 5 to Adult 8/1/2018 Allergies as of 6/13/2018  Review Complete On: 6/13/2018 By: Jonathon Marquez MD  
  
 Severity Noted Reaction Type Reactions Amoxicillin  07/22/2011   Side Effect Other (comments) Seizure Bactrim [Sulfamethoprim]  02/09/2018    Hives Current Immunizations  Never Reviewed No immunizations on file. Not reviewed this visit You Were Diagnosed With   
  
 Codes Comments Lumbosacral spondylosis without myelopathy    -  Primary ICD-10-CM: O28.151 ICD-9-CM: 721.3 Lumbar neuritis     ICD-10-CM: M54.16 
ICD-9-CM: 724.4 DDD (degenerative disc disease), lumbar     ICD-10-CM: M51.36 
ICD-9-CM: 722.52 Vitals BP Pulse Height(growth percentile) Weight(growth percentile) BMI OB Status 109/87 98 5' 3\" (1.6 m) 168 lb (76.2 kg) 29.76 kg/m2 Hysterectomy Smoking Status Former Smoker Vitals History BMI and BSA Data Body Mass Index Body Surface Area  
 29.76 kg/m 2 1.84 m 2 Preferred Pharmacy Pharmacy Name Phone [id number] [place] [address] Your Updated Medication List  
  
   
This list is accurate as of 6/13/18 11:03 AM.  Always use your most recent med list.  
  
  
  
  
 cholecalciferol 1,000 unit tablet Commonly known as:  VITAMIN D3 Take  by mouth daily. diazePAM 5 mg tablet Commonly known as:  VALIUM  
TAKE 1 TABLET BY MOUTH EVERY 6 HOURS AS NEEDED FOR MUSLCE SPASMS FLUoxetine 40 mg capsule Commonly known as:  PROzac TAKE ONE CAPSULE BY MOUTH DAILY  
  
 fluticasone 50 mcg/actuation nasal spray Commonly known as:  FLONASE  
USE 1 SPRAY IN EACH NOSTRIL SPRAY TWO TIMES DAILY AS NEEDED  
  
 ibuprofen 800 mg tablet Commonly known as:  MOTRIN Take 1 Tab by mouth three (3) times daily (with meals). LEVOTHYROXINE PO Take  by mouth.  
  
 melatonin Tab tablet Take 10 mg by mouth nightly. methylPREDNISolone 4 mg tablet Commonly known as:  Sharlynn Levo Per dose pack instructions  Indications: Erythema Multiforme  
  
 oxyCODONE-acetaminophen 5-325 mg per tablet Commonly known as:  PERCOCET Take 1 Tab by mouth every four (4) hours as needed for Pain. Max Daily Amount: 6 Tabs. pantoprazole 40 mg tablet Commonly known as:  PROTONIX  
TAKE 1 TABLET BY MOUTH 30 MINUTES BEFORE BREAKFAST  
  
 raNITIdine hcl 150 mg capsule Take 150 mg by mouth two (2) times a day. Prescriptions Sent to Pharmacy Refills  
 methylPREDNISolone (MEDROL DOSEPACK) 4 mg tablet 0 Sig: Per dose pack instructions  Indications: Erythema Multiforme Class: Normal  
 Pharmacy: [facility]/pharmacy #[id number], [address] [place] [address] Ph #: [phone]  
 ibuprofen (MOTRIN) 800 mg tablet 0 Sig: Take 1 Tab by mouth three (3) times daily (with meals). Class: Normal  
 Pharmacy: [facility]/pharmacy #[id number] [place] [address] Ph #: [phone] Route: Oral  
  
Follow-up Instructions Return in about 4 weeks (around 7/11/2018). To-Do List   
 06/15/2018 10:00 AM  
  Appointment with Nora Mcfarland PTA at [facility] PT [address] [place] [address] ([phone])  
  
 06/18/2018 11:00 AM  
  Appointment with Nora Mcfarland PTA at [facility] PT [address] [place] [address] ([phone])  
  
 06/22/2018 10:30 AM  
 Appointment with Tyshawn Shen PT at [facility]  [facility] ([phone]) Introducing [facility]! Dear Juli : Thank you for requesting a Minglebox account. Our records indicate that you already have an active Minglebox account. You can access your account anytime at [email or web address] Did you know that you can access your hospital and ER discharge instructions at any time in Minglebox? You can also review all of your test results from your hospital stay or ER visit. Additional Information If you have questions, please visit the Frequently Asked Questions section of the Minglebox website at [email or web address] Remember, Minglebox is NOT to be used for urgent needs. For medical emergencies, dial 911. Now available from your iPhone and Android! Please provide this summary of care documentation to your next provider. Your primary care clinician is listed as Austyn Rocha. If you have any questions after today's visit, please call [phone].

## 2018-06-13 NOTE — PROGRESS NOTES
[facility]  [address]  [address], [address] [place]   Phone: [phone]  Fax: [phone]        INITIAL CONSULTATION      HISTORY OF PRESENT ILLNESS:  Clif Marshall is a 50 y.o. female whom is referred from Dr. Mariely Cm secondary to low back and right buttock pain extending into the RLE in an S1 distribution the knee ongoing since mid-May 2018. No specific injury. She rates her pain 4/10. Pt reports having intermittent low back pain since 2015 but states her flare ups have been more consistent over the past couple of months. Her last flare up was in 2/2018. Her pain is exacerbated with sitting, twisting, bending and ambulation. Further discussion with the patient reveals that any sustained position or movement exacerbates her pain. Pt has a dx of fibromyalgia. Note from Dr. Renaldo Wells from 2/2014 indicating patient was seen with c/o left shoulder pain. According to her note, patient reported intolerance to [address] and LYRICA. Pt recalls tolerating Neurontin with no relief. ER note dated 5/23/18 indicating patient presented for low back and RLQ pain without sciatica. Of note, any type of movement aggravates her pain. At that time, she was treated with Valium and Percocet. Note from Dr. Mariely Cm dated 5/25/18 indicating patient was seen with c/o low back pain radiating to right leg. Pt denies specific injury. Worse since sitting on a couch about the middle of May. At that time, she underwent a TPI in the paralumbar region. Pt states she was previously treated with Robaxin. She notes being prescribed a MDP in 12/2017 for a different issue. Pt is currently attending physical therapy for her lumbar spine and has been given a HEP. Denies hx of lumbar surgery or injections. Pt denies change in bowel or bladder habits. She denies possibility of pregnancy or breastfeeding (h/o hysterectomy). Pt denies fever, weight loss, or skin changes.   Lumbar spine XR films obtained 5/25/18 per report revealed: no fractures, L12 and L5-S1 disc space narrowing, anterior vertebral osteophytes present. No spondylolisthesis. Upon review myself: there was moderate disc space narrowing L1-2, and L5-S1, and mild disc space narrowing at L4-5. Small anterior osteophytes noted throughout the lumbar spine. No acute pathology identified.  reviewed. Body mass index is 29.76 kg/(m^2). PCP: Gabriel Abebe MD    Past Medical History:   Diagnosis Date    Allergy     Anxiety     Fibromyalgia           Past Surgical History:   Procedure Laterality Date    HX GYN      HX ORTHOPAEDIC           Social History   Substance Use Topics    Smoking status: Former Smoker     Quit date: 7/22/2009    Smokeless tobacco: Never Used    Alcohol use 1.8 oz/week     1 Glasses of wine, 1 Cans of beer, 1 Shots of liquor per week      Comment: one drink per day     Work status: The patient is not employed. Marital status: The patient is . Current Outpatient Prescriptions   Medication Sig Dispense Refill    methylPREDNISolone (MEDROL DOSEPACK) 4 mg tablet Per dose pack instructions  Indications: Erythema Multiforme 1 Dose Pack 0    ibuprofen (MOTRIN) 800 mg tablet Take 1 Tab by mouth three (3) times daily (with meals). 45 Tab 0    raNITIdine hcl 150 mg capsule Take 150 mg by mouth two (2) times a day.  fluticasone (FLONASE) 50 mcg/actuation nasal spray USE 1 SPRAY IN EACH NOSTRIL SPRAY TWO TIMES DAILY AS NEEDED  2    melatonin tab tablet Take 10 mg by mouth nightly.  FLUoxetine (PROZAC) 40 mg capsule TAKE ONE CAPSULE BY MOUTH DAILY  3    pantoprazole (PROTONIX) 40 mg tablet TAKE 1 TABLET BY MOUTH 30 MINUTES BEFORE BREAKFAST  1    LEVOTHYROXINE SODIUM (LEVOTHYROXINE PO) Take  by mouth.  cholecalciferol, vitamin d3, (VITAMIN D3) 1,000 unit tablet Take  by mouth daily.       diazePAM (VALIUM) 5 mg tablet TAKE 1 TABLET BY MOUTH EVERY 6 HOURS AS NEEDED FOR MUSLCE SPASMS  0    oxyCODONE-acetaminophen (PERCOCET) 5-325 mg per tablet Take 1 Tab by mouth every four (4) hours as needed for Pain. Max Daily Amount: 6 Tabs. (Patient not taking: Reported on 6/13/2018) 24 Tab 0       Allergies   Allergen Reactions    Amoxicillin Other (comments)     Seizure      Bactrim [Sulfamethoprim] Hives          History reviewed. No pertinent family history. REVIEW OF SYSTEMS  Constitutional symptoms: Negative  Eyes: Negative  Ears, Nose, Throat, and Mouth: Negative  Cardiovascular: Negative  Respiratory: Negative  Genitourinary: Negative  Integumentary (Skin and/or breast): Negative  Musculoskeletal: Positive for low back, right buttock and RLE pain. Extremities: Negative for edema. Endocrine/Rheumatologic: Negative  Hematologic/Lymphatic: Negative  Allergic/Immunologic: Negative  Psychiatric: Negative       PHYSICAL EXAMINATION    Visit Vitals    /87    Pulse 98    Ht 5' 3\" (1.6 m)    Wt 76.2 kg (168 lb)    BMI 29.76 kg/m2       CONSTITUTIONAL: Pt is anxious  HEART: Regular rate and rhythm  ABDOMEN: Positive bowel sounds, soft, nontender, and nondistended  LUNGS: Clear to auscultation bilaterally. SKIN: Negative for rash. RANGE OF MOTION: The patient has full passive range of motion in all four extremities. SENSATION: Decreased sensation to light touch at L5 and S1 of the RLE. Sensation to light touch otherwise intact. Estanislado Lobstein MOTOR:   Straight Leg Raise: Negative, bilateral  [place]: Negative, bilateral   Deep tendon reflexes are 2+ at the brachioradialis, 1+ at the biceps, and 2+ at the triceps bilaterally. Deep tendon reflexes are 2+ at the knees and ankles bilaterally.      Shoulder AB/Flex Elbow Flex Wrist Ext Elbow Ext Wrist Flex Hand Intrin Tone   Right +4/5 +4/5 +4/5 +4/5 +4/5 +4/5 +4/5   Left +4/5 +4/5 +4/5 +4/5 +4/5 +4/5 +4/5              Hip Flex Knee Ext Knee Flex Ankle DF GTE Ankle PF Tone   Right +4/5 +4/5 +4/5 +4/5 +4/5 +4/5 +4/5   Left +4/5 +4/5 +4/5 +4/5 +4/5 +4/5 +4/5         ASSESSMENT Diagnoses and all orders for this visit:    1. Lumbosacral spondylosis without myelopathy    2. Lumbar neuritis    3. DDD (degenerative disc disease), lumbar    Other orders  -     methylPREDNISolone (MEDROL DOSEPACK) 4 mg tablet; Per dose pack instructions  Indications: Erythema Multiforme  -     ibuprofen (MOTRIN) 800 mg tablet; Take 1 Tab by mouth three (3) times daily (with meals). IMPRESSIONS/RECOMMENDATIONS:  Patient describes symptoms consistent with S1 radiculopathy. Patient is neurologically intact. I discussed with patient multiple treatment options. Patient should complete physical therapy as prescribed and continue with her HEP daily. I will start her on Medrol Dosepak. I gave her a prescription for Ibuprofen 800 mg TID x two weeks following completion of the Medrol Dosepak. I will see the patient back in 1 month's time or earlier if needed. Written by Carlos Montero, as dictated by Marcelo Gamez MD  I examined the patient, reviewed and agree with the note.

## 2018-06-15 ENCOUNTER — HOSPITAL ENCOUNTER (OUTPATIENT)
Dept: PHYSICAL THERAPY | Age: 48
Discharge: HOME | End: 2018-06-15
Payer: COMMERCIAL

## 2018-06-15 PROCEDURE — 97110 THERAPEUTIC EXERCISES: CPT

## 2018-06-15 PROCEDURE — 97140 MANUAL THERAPY 1/> REGIONS: CPT

## 2018-06-15 NOTE — PROGRESS NOTES
PT DAILY TREATMENT NOTE     Patient Name: Estefania Mccurdy  Date:6/15/2018  : 1970  [x]  Patient  Verified  Payor: [employer] / Plan: [address] [place] [address] / Product Type: PPO /    In time:956  Out time:1040  Total Treatment Time (min): 44  Visit #: 5 of 8    Treatment Area: Neck pain [M54.2]  Low back pain [M54.5]    SUBJECTIVE  Pain Level (0-10 scale): 2  Any medication changes, allergies to medications, adverse drug reactions, diagnosis change, or new procedure performed?: [] No    [x] Yes (see summary sheet for update)  Subjective functional status/changes:   [] No changes reported  \"I felt better after last visit. \"    OBJECTIVE    Modality rationale: decrease pain to improve the patients ability to improve mobility and positional tolerance   Min Type Additional Details    [] Estim:  []Unatt       []IFC  []Premod                        []Other:  []w/ice   []w/heat  Position:  Location:    [] Estim: []Att    []TENS instruct  []NMES                    []Other:  []w/US   []w/ice   []w/heat  Position:  Location:    []  Traction: [] Cervical       []Lumbar                       [] Prone          []Supine                       []Intermittent   []Continuous Lbs:  [] before manual  [] after manual    []  Ultrasound: []Continuous   [] Pulsed                           []1MHz   []3MHz W/cm2:  Location:    []  Iontophoresis with dexamethasone         Location: [] Take home patch   [] In clinic   10 []  Ice     [x]  heat  []  Ice massage  []  Laser   []  Anodyne Position: seated   Location: lower back and neck    []  Laser with stim  []  Other:  Position:  Location:    []  Vasopneumatic Device Pressure:       [] lo [] med [] hi   Temperature: [] lo [] med [] hi   [x] Skin assessment post-treatment:  [x]intact []redness- no adverse reaction    []redness - adverse reaction:     26 min Therapeutic Exercise:  [x] See flow sheet :   Rationale: increase ROM and increase strength to improve the patients ability to perform ADLs    8 min Manual Therapy:  SOR, STM to c/s paraspinals and B UT   Rationale: decrease pain, increase ROM, increase tissue extensibility, decrease trigger points and increase postural awareness to improve mobility and positional tolerance         With   [x] TE   [] TA   [] neuro   [] other: Patient Education: [x] Review HEP    [] Progressed/Changed HEP based on:   [x] positioning   [x] body mechanics   [] transfers   [] heat/ice application    [] other:      Other Objective/Functional Measures:      Pain Level (0-10 scale) post treatment: 2    ASSESSMENT/Changes in Function: Pt is progressing well with her pain. She reports waking up earlier this week and had pain down her leg. She saw her MD yesterday and received percocet which has helped. Patient will continue to benefit from skilled PT services to modify and progress therapeutic interventions, address functional mobility deficits, address ROM deficits, address strength deficits, analyze and address soft tissue restrictions, analyze and cue movement patterns, analyze and modify body mechanics/ergonomics, assess and modify postural abnormalities, address imbalance/dizziness and instruct in home and community integration to attain remaining goals. [x]  See Plan of Care  []  See progress note/recertification  []  See Discharge Summary         Progress towards goals / Updated goals:  Short Term Goals: To be accomplished in 1 weeks:  1. Establish HEP for ROM & Strengthening. New Nila be accomplished in 4 weeks:  1. Patient will be independent with HEP for ROM & Strengthening. Eval Status:n/a   Reports initial compliance   2. Pt will increase cervical rotation bilaterally  By 10 degrees to increase ease with ADLs. Eval Status:right rotation: 40 deg, left rotation: 35 deg   Measure at later visit  3. Pt will increase FOTO score to 71/66 points to demostrate improved function.    Eval Status: FOTO: 59/43   Assess at 30 day cathryn  4. Pt will report ability to walk >30 min without pain to improve functional mobility. Eval Status:limited by pain.    No change to note    PLAN  []  Upgrade activities as tolerated     [x]  Continue plan of care  []  Update interventions per flow sheet       []  Discharge due to:_  []  Other:_      Nathalie Gambino PTA, [facility] 6/15/2018  10:48 AM    Future Appointments  Date Time Provider Shabbir Ewing   6/18/2018 11:00 AM Nathalie Gambino PTA [facility]   6/22/2018 10:30 AM Anson Mina PT [facility]   7/19/2018 10:30 AM Delma Watkins  [address]

## 2018-06-18 ENCOUNTER — APPOINTMENT (OUTPATIENT)
Dept: PHYSICAL THERAPY | Age: 48
End: 2018-06-18
Payer: COMMERCIAL

## 2018-06-22 ENCOUNTER — HOSPITAL ENCOUNTER (OUTPATIENT)
Dept: PHYSICAL THERAPY | Age: 48
Discharge: HOME | End: 2018-06-22
Payer: COMMERCIAL

## 2018-06-22 PROCEDURE — 97110 THERAPEUTIC EXERCISES: CPT

## 2018-06-22 PROCEDURE — 97140 MANUAL THERAPY 1/> REGIONS: CPT

## 2018-06-22 NOTE — PROGRESS NOTES
PT DAILY TREATMENT NOTE 12    Patient Name: Jose Antonio Zee  Date:2018  : 1970  [x]  Patient  Verified  Payor: [employer] / Plan: [address] [place] [address] / Product Type: PPO /    In time:1030  Out time:1125  Total Treatment Time (min): 55  Visit #: 6 of 8    Treatment Area: Neck pain [M54.2]  Low back pain [M54.5]    SUBJECTIVE  Pain Level (0-10 scale): 6  Any medication changes, allergies to medications, adverse drug reactions, diagnosis change, or new procedure performed?: [x] No    [] Yes (see summary sheet for update)  Subjective functional status/changes:   [] No changes reported  \"I'm still so tight in my neck. It feels good for a little while after therapy but it doesn't last forever. \"    OBJECTIVE    Modality rationale: decrease pain and increase tissue extensibility to improve the patients ability to improve ease with mobility.     Min Type Additional Details    [] Estim:  []Unatt       []IFC  []Premod                        []Other:  []w/ice   []w/heat  Position:  Location:    [] Estim: []Att    []TENS instruct  []NMES                    []Other:  []w/US   []w/ice   []w/heat  Position:  Location:    []  Traction: [] Cervical       []Lumbar                       [] Prone          []Supine                       []Intermittent   []Continuous Lbs:  [] before manual  [] after manual    []  Ultrasound: []Continuous   [] Pulsed                           []1MHz   []3MHz W/cm2:  Location:    []  Iontophoresis with dexamethasone         Location: [] Take home patch   [] In clinic   10 []  Ice     [x]  heat  []  Ice massage  []  Laser   []  Anodyne Position:supine with legs on wedge  Location:neck & low back    []  Laser with stim  []  Other:  Position:  Location:    []  Vasopneumatic Device Pressure:       [] lo [] med [] hi   Temperature: [] lo [] med [] hi   [x] Skin assessment post-treatment:  [x]intact []redness- no adverse reaction    []redness - adverse reaction:     26 min Therapeutic Exercise:  [x] See flow sheet :   Rationale: increase ROM and increase strength to improve the patients ability to perform ADLs     8 min Manual Therapy:  SOR, STM to c/s paraspinals and B UT   Rationale: decrease pain, increase ROM, increase tissue extensibility, decrease trigger points and increase postural awareness to improve mobility and positional tolerance                With   [] TE   [] TA   [] neuro   [] other: Patient Education: [x] Review HEP    [] Progressed/Changed HEP based on:   [] positioning   [] body mechanics   [] transfers   [] heat/ice application    [] other:      Other Objective/Functional Measures:      Pain Level (0-10 scale) post treatment: 4    ASSESSMENT/Changes in Function:   Ms. Tea Sevilla responds well to manual therapy and moist heat, but has not seen much carryover of relief between sessions. Headaches remain a limiting factor. Patient will continue to benefit from skilled PT services to modify and progress therapeutic interventions, address functional mobility deficits, address ROM deficits, address strength deficits, analyze and address soft tissue restrictions, analyze and cue movement patterns, analyze and modify body mechanics/ergonomics, assess and modify postural abnormalities, address imbalance/dizziness and instruct in home and community integration to attain remaining goals. []  See Plan of Care  []  See progress note/recertification  []  See Discharge Summary         Progress towards goals / Updated goals:  Short Term Goals: To be accomplished in 1 weeks:  1. Establish HEP for ROM & Strengthening. New Nila be accomplished in 4 weeks:  1. Patient will be independent with HEP for ROM & Strengthening. Eval Status:n/a                        Reports initial compliance   2. Pt will increase cervical rotation bilaterally  By 10 degrees to increase ease with ADLs.                          Eval Status:right rotation: 40 deg, left rotation: 35 deg                        Measure at later visit  3. Pt will increase FOTO score to 71/66 points to demostrate improved function. Eval Status: FOTO: 59/43                        Assess at 30 day cathryn  4. Pt will report ability to walk >30 min without pain to improve functional mobility. Eval Status:limited by pain.                         No change to note       PLAN  []  Upgrade activities as tolerated     [x]  Continue plan of care  []  Update interventions per flow sheet       []  Discharge due to:_  []  Other:_      Ambrocio Sosa PT 6/22/2018  12:44 PM    Future Appointments  Date Time Provider Shabbir Ewing   6/28/2018 9:00 AM Ambrocio Sosa PT [facility]   6/29/2018 2:00 PM Hailey Vigil [facility]   7/19/2018 10:30 AM So Soliz  [address] [facility]

## 2018-06-28 ENCOUNTER — HOSPITAL ENCOUNTER (OUTPATIENT)
Dept: PHYSICAL THERAPY | Age: 48
Discharge: HOME | End: 2018-06-28
Payer: COMMERCIAL

## 2018-06-28 PROCEDURE — 97014 ELECTRIC STIMULATION THERAPY: CPT

## 2018-06-28 PROCEDURE — 97140 MANUAL THERAPY 1/> REGIONS: CPT

## 2018-06-28 PROCEDURE — 97110 THERAPEUTIC EXERCISES: CPT

## 2018-06-28 NOTE — PROGRESS NOTES
PT DAILY TREATMENT NOTE 12    Patient Name: Ajith Hugo  Date:2018  : 1970  [x]  Patient  Verified  Payor: [employer] [place] / Plan: [address] [place] [address] / Product Type: PPO /    In time:859  Out time:950  Total Treatment Time (min): 51  Visit #: 7 of 8    Treatment Area: Neck pain [M54.2]  Low back pain [M54.5]    SUBJECTIVE  Pain Level (0-10 scale): 4  Any medication changes, allergies to medications, adverse drug reactions, diagnosis change, or new procedure performed?: [x] No    [] Yes (see summary sheet for update)  Subjective functional status/changes:   [] No changes reported  \"I'm doing ok right now. I just have stress that makes me more tense and I think the fibro contributes. \"    OBJECTIVE    Modality rationale: decrease pain and increase tissue extensibility to improve the patients ability to improve ease with mobility   Min Type Additional Details   10 [x] Estim:  [x]Unatt       [x]IFC  []Premod                        []Other:  []w/ice   [x]w/heat  Position:seated  Location:IFC to low back, MH to neck & low back    [] Estim: []Att    []TENS instruct  []NMES                    []Other:  []w/US   []w/ice   []w/heat  Position:  Location:    []  Traction: [] Cervical       []Lumbar                       [] Prone          []Supine                       []Intermittent   []Continuous Lbs:  [] before manual  [] after manual    []  Ultrasound: []Continuous   [] Pulsed                           []1MHz   []3MHz W/cm2:  Location:    []  Iontophoresis with dexamethasone         Location: [] Take home patch   [] In clinic    []  Ice     []  heat  []  Ice massage  []  Laser   []  Anodyne Position:  Location:    []  Laser with stim  []  Other:  Position:  Location:    []  Vasopneumatic Device Pressure:       [] lo [] med [] hi   Temperature: [] lo [] med [] hi   [x] Skin assessment post-treatment:  [x]intact []redness- no adverse reaction    []redness - adverse reaction:         33 min Therapeutic Exercise:  [x] See flow sheet :   Rationale: increase ROM and increase strength to improve the patients ability to perform ADLs      8 min Manual Therapy:  SOR, STM to c/s paraspinals and B UT   Rationale: decrease pain, increase ROM, increase tissue extensibility, decrease trigger points and increase postural awareness to improve mobility and positional tolerance                With   [] TE   [] TA   [] neuro   [] other: Patient Education: [x] Review HEP    [] Progressed/Changed HEP based on:   [] positioning   [] body mechanics   [] transfers   [] heat/ice application    [] other:      Other Objective/Functional Measures:      Pain Level (0-10 scale) post treatment: 3    ASSESSMENT/Changes in Function: Ms. Naresh Cruz responded well to manual therapy and IFC for decrease in pain. Plan is for probable transition to HEP at tomorrow's scheduled visit. Patient will continue to benefit from skilled PT services to modify and progress therapeutic interventions, address functional mobility deficits, address ROM deficits, address strength deficits, analyze and address soft tissue restrictions, analyze and cue movement patterns, analyze and modify body mechanics/ergonomics, assess and modify postural abnormalities, address imbalance/dizziness and instruct in home and community integration to attain remaining goals. []  See Plan of Care  []  See progress note/recertification  []  See Discharge Summary         Progress towards goals / Updated goals:  Short Term Goals: To be accomplished in 1 weeks:  1. Establish HEP for ROM & Strengthening.                        FSP   [address] [facility] be accomplished in 4 weeks:  1. Patient will be independent with HEP for ROM & Strengthening.                        Eval Status:n/a                        Reports initial compliance   2. Pt will increase cervical rotation bilaterally  By 10 degrees to increase ease with ADLs.                          Eval Status:right rotation: 40 deg, left rotation: 35 deg                        Measure at end of POC  3. Pt will increase FOTO score to 71/66 points to demostrate improved function.                        Eval Status: FOTO: 59/43                           Measure at end of POC  4. Pt will report ability to walk >30 min without pain to improve functional mobility.                          Eval Status:limited by pain.                        No change to note    PLAN  []  Upgrade activities as tolerated     [x]  Continue plan of care  []  Update interventions per flow sheet       []  Discharge due to:_  []  Other:_      Dirk Oneill PT 6/28/2018  9:03 AM    Future Appointments  Date Time Provider Shabbir Ewing   6/29/2018 2:00 PM [facility]   7/19/2018 10:30 AM Anne Bingham  [address]

## 2018-06-29 ENCOUNTER — APPOINTMENT (OUTPATIENT)
Dept: PHYSICAL THERAPY | Age: 48
End: 2018-06-29
Payer: COMMERCIAL

## 2018-07-05 NOTE — PROGRESS NOTES
[facility] - [place] [address]  [address], [address] [phone] fax    Discharge Summary  Patient name: Geovanni Roberts Start of Care: 2018   Referral source: Ankit Munroe MD : 1970                          Medical Diagnosis: Neck pain [M54.2]  Low back pain [M54.5] Onset Date:18                          Treatment Diagnosis: neck & back pain   Prior Hospitalization: see medical history Provider#: [id number]   Medications: Verified on Patient summary List    Comorbidities: fibromyalgia, thyroid issues   Prior Level of Function: currently not working. Has been denied disability X 2 attempts. Lives in 2 story home. Functionally independent but slow/modified with ADLs      Visits from Start of Care: 7    Missed Visits: 1    Reporting Period : 18 to 18    Short Term Goals: To be accomplished in 1 weeks:  1. Establish HEP for ROM & Strengthening.                        NGX   [address] [facility] be accomplished in 4 weeks:  1. Patient will be independent with HEP for ROM & Strengthening.                        Eval Status:n/a                        met: Reports initial compliance   2. Pt will increase cervical rotation bilaterally  By 10 degrees to increase ease with ADLs.                       Eval Status:right rotation: 40 deg, left rotation: 35 deg                          unable to reassess  3. Pt will increase FOTO score to 71/66 points to demostrate improved function.                        Eval Status: FOTO: 59/43                            unable to reassess  4. Pt will report ability to walk >30 min without pain to improve functional mobility.                       Eval Status:limited by pain.                        unable to reassess    Assessment/Summary of care:Ms. Nayeli Rosado has seen some imrpovement with manual therapy and reports overall improvement.  Patient reports stress is a major limiting factor in her recovery and feels it will take time to improve positional tolerance. She called to cancel her last scheduled appt due to illness. Patient wishes to transition to [facility] for independent management of remaining symptoms.      RECOMMENDATIONS:  [x]Discontinue therapy: [x]Patient has reached or is progressing toward set goals      []Patient is non-compliant or has abdicated      []Due to lack of appreciable progress towards set [address], PT 7/5/2018 9:17 AM

## 2018-07-26 ENCOUNTER — OFFICE VISIT (OUTPATIENT)
Dept: ORTHOPEDIC SURGERY | Age: 48
End: 2018-07-26

## 2018-07-26 VITALS
DIASTOLIC BLOOD PRESSURE: 68 MMHG | WEIGHT: 171 LBS | TEMPERATURE: 97.9 F | SYSTOLIC BLOOD PRESSURE: 112 MMHG | HEART RATE: 88 BPM | BODY MASS INDEX: 30.3 KG/M2 | OXYGEN SATURATION: 100 % | HEIGHT: 63 IN

## 2018-07-26 DIAGNOSIS — M51.36 DDD (DEGENERATIVE DISC DISEASE), LUMBAR: ICD-10-CM

## 2018-07-26 DIAGNOSIS — M47.817 LUMBOSACRAL SPONDYLOSIS WITHOUT MYELOPATHY: Primary | ICD-10-CM

## 2018-07-26 DIAGNOSIS — M54.16 LUMBAR RADICULOPATHY: ICD-10-CM

## 2018-07-26 NOTE — PROGRESS NOTES
[facility]  [address] [facility]  [address], [address]   Phone: [phone]  Fax: [phone]        PROGRESS NOTE      HISTORY OF PRESENT ILLNESS:  The patient is a 50 y.o. female and was seen today for follow up of low back and right buttock pain extending into the RLE in an S1 distribution the knee ongoing since mid-May 2018. No specific injury. Pt reports having intermittent low back pain since 2015 but states her flare ups have been more consistent over the past couple of months. Her last flare up was in 2/2018. Her pain is exacerbated with sitting, twisting, bending and ambulation. Further discussion with the patient reveals that any sustained position or movement exacerbates her pain. Pt has a dx of fibromyalgia. Note from Dr. Cheral Boxer from 2/2014 indicating patient was seen with c/o left shoulder pain. According to her note, patient reported intolerance to [address] and LYRICA. Pt recalls tolerating Neurontin with no relief. ER note dated 5/23/18 indicating patient presented for low back and RLQ pain without sciatica. Of note, any type of movement aggravates her pain. At that time, she was treated with Valium and Percocet. Note from Dr. Mesha Jacobsen dated 5/25/18 indicating patient was seen with c/o low back pain radiating to right leg. Pt denies specific injury. Worse since sitting on a couch about the middle of May. At that time, she underwent a TPI in the paralumbar region. Pt states she was previously treated with Robaxin. She notes being prescribed a MDP in 12/2017 for a different issue. Pt is currently attending physical therapy for her lumbar spine and has been given a HEP. Denies hx of lumbar surgery or injections. Pt denies change in bowel or bladder habits. She denies possibility of pregnancy or breastfeeding (h/o hysterectomy). Pt denies fever, weight loss, or skin changes.   Lumbar spine XR films obtained 5/25/18 per report revealed: no fractures, L12 and L5-S1 disc space narrowing, anterior vertebral osteophytes present. No spondylolisthesis. Upon review myself: there was moderate disc space narrowing L1-2, and L5-S1, and mild disc space narrowing at L4-5. Small anterior osteophytes noted throughout the lumbar spine. No acute pathology identified. At her last clinical appointment, patient described symptoms consistent with S1 radiculopathy. Patient was neurologically intact. Patient completed physical therapy as prescribed and continued with her HEP daily. I started her on Medrol Dosepak. I gave her a prescription for Ibuprofen 800 mg TID x two weeks following completion of the Medrol Dosepak. The patient returns today with low back pain radiating into the BLE (L>R) to the ankle in an L5 distribution. She additionally endorses paraesthesias of the left big toe. She rates her pain 4-5/10, a slight increase from her last visit (4/10). She is completing her HEP daily. She reports that physical therapy did not help with the pain, but did lessen her muscle spasms. She reports some relief of her radicular symptoms following MDP. Pt denies change in bowel or bladder habits. She denies possibility of pregnancy or breastfeeding (Hx hysterectomy)    Therapy notes reviewed.  reviewed. Body mass index is 30.29 kg/(m^2). PCP: Marcelino Ayon MD      Past Medical History:   Diagnosis Date    Allergy     Anxiety     Fibromyalgia         Social History     Social History    Marital status:      Spouse name: N/A    Number of children: N/A    Years of education: N/A     Occupational History    Not on file.      Social History Main Topics    Smoking status: Former Smoker     Quit date: 7/22/2009    Smokeless tobacco: Never Used    Alcohol use 1.8 oz/week     1 Glasses of wine, 1 Cans of beer, 1 Shots of liquor per week      Comment: one drink per day    Drug use: Not on file    Sexual activity: Not on file     Other Topics Concern    Not on file     Social History Narrative       Current Outpatient Prescriptions   Medication Sig Dispense Refill    ibuprofen (MOTRIN) 800 mg tablet Take 1 Tab by mouth three (3) times daily (with meals). (Patient taking differently: Take 800 mg by mouth as needed.) 45 Tab 0    raNITIdine hcl 150 mg capsule Take 150 mg by mouth as needed.  fluticasone (FLONASE) 50 mcg/actuation nasal spray USE 1 SPRAY IN EACH NOSTRIL SPRAY TWO TIMES DAILY AS NEEDED  2    melatonin tab tablet Take 10 mg by mouth nightly.  FLUoxetine (PROZAC) 40 mg capsule TAKE ONE CAPSULE BY MOUTH DAILY  3    pantoprazole (PROTONIX) 40 mg tablet TAKE 1 TABLET BY MOUTH 30 MINUTES BEFORE BREAKFAST  1    LEVOTHYROXINE SODIUM (LEVOTHYROXINE PO) Take  by mouth.  cholecalciferol, vitamin d3, (VITAMIN D3) 1,000 unit tablet Take  by mouth daily.  methylPREDNISolone (MEDROL DOSEPACK) 4 mg tablet Per dose pack instructions  Indications: Erythema Multiforme 1 Dose Pack 0    diazePAM (VALIUM) 5 mg tablet TAKE 1 TABLET BY MOUTH EVERY 6 HOURS AS NEEDED FOR MUSLCE SPASMS  0    oxyCODONE-acetaminophen (PERCOCET) 5-325 mg per tablet Take 1 Tab by mouth every four (4) hours as needed for Pain. Max Daily Amount: 6 Tabs. (Patient not taking: Reported on 6/13/2018) 24 Tab 0       Allergies   Allergen Reactions    Amoxicillin Other (comments)     Seizure      Bactrim [Sulfamethoprim] Hives          PHYSICAL EXAMINATION    Visit Vitals    /68    Pulse 88    Temp 97.9 °F (36.6 °C) (Oral)    Ht 5' 3\" (1.6 m)    Wt 77.6 kg (171 lb)    SpO2 100%    BMI 30.29 kg/m2       CONSTITUTIONAL: NAD, A&O x 3  SENSATION: Decreased sensation to light touch S1 of the LLE. Sensation to light touch otherwise intact. RANGE OF MOTION: The patient has full passive range of motion in all four extremities.   MOTOR:  Straight Leg Raise: Negative, bilateral               Hip Flex Knee Ext Knee Flex Ankle DF GTE Ankle PF Tone   Right +4/5 +4/5 +4/5 +4/5 +4/5 +4/5 +4/5   Left +4/5 +4/5 +4/5 +4/5 +4/5 +4/5 +4/5       ASSESSMENT   Diagnoses and all orders for this visit:    1. Lumbosacral spondylosis without myelopathy    2. DDD (degenerative disc disease), lumbar    3. Lumbar radiculopathy          IMPRESSION AND PLAN:  The patient does not think her remaining pain complaints are severe enough to warrant additional workup/treatment at this time. Patient is neurologically intact. I recommend she continue with her HEP daily. I will see the patient back prn. Written by Raz Jacobson, as dictated by Jamaica Carmichael MD  I examined the patient, reviewed and agree with the note.

## 2018-07-26 NOTE — MR AVS SNAPSHOT
[address] [place] [address] [address] [place] [address] 
[phone] Patient: Kristian Storey MRN: [id number] FXC:[id number] Visit Information Date & Time Provider Department Dept. Phone Encounter #  
 7/26/2018  8:35 AM Edmond Membreno MD [facility] [phone] [id number] Follow-up Instructions Return if symptoms worsen or fail to improve. Upcoming Health Maintenance Date Due DTaP/Tdap/Td series (1 - Tdap) 3/24/1991 PAP AKA CERVICAL CYTOLOGY 3/24/1991 Influenza Age 5 to Adult 8/1/2018 Allergies as of 7/26/2018  Review Complete On: 7/26/2018 By: Edmond Membreno MD  
  
 Severity Noted Reaction Type Reactions Amoxicillin  07/22/2011   Side Effect Other (comments) Seizure Bactrim [Sulfamethoprim]  02/09/2018    Hives Current Immunizations  Never Reviewed No immunizations on file. Not reviewed this visit You Were Diagnosed With   
  
 Codes Comments Lumbosacral spondylosis without myelopathy    -  Primary ICD-10-CM: B70.509 ICD-9-CM: 721.3 DDD (degenerative disc disease), lumbar     ICD-10-CM: M51.36 
ICD-9-CM: 722.52 Lumbar radiculopathy     ICD-10-CM: M54.16 
ICD-9-CM: 724.4 Vitals BP Pulse Temp Height(growth percentile) Weight(growth percentile) SpO2  
 112/68 88 97.9 °F (36.6 °C) (Oral) 5' 3\" (1.6 m) 171 lb (77.6 kg) 100% BMI OB Status Smoking Status 30.29 kg/m2 Hysterectomy Former Smoker BMI and BSA Data Body Mass Index Body Surface Area  
 30.29 kg/m 2 1.86 m 2 Preferred Pharmacy Pharmacy Name Phone [id number] [address] Your Updated Medication List  
  
   
This list is accurate as of 7/26/18  8:59 AM.  Always use your most recent med list.  
  
  
  
  
 cholecalciferol 1,000 unit tablet Commonly known as:  VITAMIN D3  
 Take  by mouth daily. diazePAM 5 mg tablet Commonly known as:  VALIUM  
TAKE 1 TABLET BY MOUTH EVERY 6 HOURS AS NEEDED FOR MUSLCE SPASMS FLUoxetine 40 mg capsule Commonly known as:  PROzac TAKE ONE CAPSULE BY MOUTH DAILY  
  
 fluticasone 50 mcg/actuation nasal spray Commonly known as:  FLONASE  
USE 1 SPRAY IN EACH NOSTRIL SPRAY TWO TIMES DAILY AS NEEDED  
  
 ibuprofen 800 mg tablet Commonly known as:  MOTRIN Take 1 Tab by mouth three (3) times daily (with meals). LEVOTHYROXINE PO Take  by mouth.  
  
 melatonin Tab tablet Take 10 mg by mouth nightly. methylPREDNISolone 4 mg tablet Commonly known as:  Juan Warner Per dose pack instructions  Indications: Erythema Multiforme  
  
 oxyCODONE-acetaminophen 5-325 mg per tablet Commonly known as:  PERCOCET Take 1 Tab by mouth every four (4) hours as needed for Pain. Max Daily Amount: 6 Tabs. pantoprazole 40 mg tablet Commonly known as:  PROTONIX  
TAKE 1 TABLET BY MOUTH 30 MINUTES BEFORE BREAKFAST  
  
 raNITIdine hcl 150 mg capsule Take 150 mg by mouth as needed. Follow-up Instructions Return if symptoms worsen or fail to improve. Introducing [facility]! Dear Sandra Glover: Thank you for requesting a [employer] account. Our records indicate that you already have an active [employer] account. You can access your account anytime at [email or web address] Did you know that you can access your hospital and ER discharge instructions at any time in [employer]? You can also review all of your test results from your hospital stay or ER visit. Additional Information If you have questions, please visit the Frequently Asked Questions section of the [employer] website at [email or web address] Remember, [employer] is NOT to be used for urgent needs. For medical emergencies, dial 911. Now available from your iPhone and Android! Please provide this summary of care documentation to your next provider. Your primary care clinician is listed as Herb Ignacio. If you have any questions after today's visit, please call [phone].

## 2018-11-06 ENCOUNTER — HOSPITAL ENCOUNTER (OUTPATIENT)
Dept: MRI IMAGING | Age: 48
Discharge: HOME | End: 2018-11-06
Attending: PSYCHIATRY & NEUROLOGY
Payer: COMMERCIAL

## 2018-11-06 DIAGNOSIS — R20.0 TACTILE ANESTHESIA: ICD-10-CM

## 2018-11-06 DIAGNOSIS — G35 MULTIPLE SCLEROSIS (HCC): ICD-10-CM

## 2018-11-06 PROCEDURE — 74011636320 HC RX REV CODE- 636/320

## 2018-11-06 PROCEDURE — 70553 MRI BRAIN STEM W/O & W/DYE: CPT

## 2018-11-06 PROCEDURE — A9575 INJ GADOTERATE MEGLUMI 0.1ML: HCPCS

## 2018-11-06 RX ADMIN — GADOTERATE MEGLUMINE 15 ML: 376.9 INJECTION INTRAVENOUS at 19:00

## 2019-01-31 ENCOUNTER — HOSPITAL ENCOUNTER (OUTPATIENT)
Dept: MAMMOGRAPHY | Age: 49
Discharge: HOME | End: 2019-01-31
Attending: FAMILY MEDICINE
Payer: COMMERCIAL

## 2019-01-31 DIAGNOSIS — Z12.31 VISIT FOR SCREENING MAMMOGRAM: ICD-10-CM

## 2019-01-31 PROCEDURE — 77063 BREAST TOMOSYNTHESIS BI: CPT

## 2019-11-13 RX ORDER — IBUPROFEN 800 MG/1
800 TABLET ORAL
Qty: 45 TAB | Refills: 0 | OUTPATIENT
Start: 2019-11-13

## 2019-12-16 ENCOUNTER — HOSPITAL ENCOUNTER (OUTPATIENT)
Dept: GENERAL RADIOLOGY | Age: 49
Discharge: HOME | End: 2019-12-16
Payer: COMMERCIAL

## 2019-12-16 DIAGNOSIS — R06.02 SOB (SHORTNESS OF BREATH): ICD-10-CM

## 2019-12-16 PROCEDURE — 71046 X-RAY EXAM CHEST 2 VIEWS: CPT

## 2020-02-03 ENCOUNTER — HOSPITAL ENCOUNTER (OUTPATIENT)
Dept: MAMMOGRAPHY | Age: 50
Discharge: HOME | End: 2020-02-03
Attending: NURSE PRACTITIONER
Payer: COMMERCIAL

## 2020-02-03 DIAGNOSIS — Z12.31 ENCOUNTER FOR SCREENING MAMMOGRAM FOR BREAST CANCER: ICD-10-CM

## 2020-02-03 PROCEDURE — 77063 BREAST TOMOSYNTHESIS BI: CPT

## 2020-09-24 ENCOUNTER — HOSPITAL ENCOUNTER (OUTPATIENT)
Dept: LAB | Age: 50
Discharge: HOME | End: 2020-09-24
Payer: COMMERCIAL

## 2020-09-24 PROCEDURE — 83993 ASSAY FOR CALPROTECTIN FECAL: CPT

## 2020-09-24 PROCEDURE — 83516 IMMUNOASSAY NONANTIBODY: CPT

## 2020-09-24 PROCEDURE — 36415 COLL VENOUS BLD VENIPUNCTURE: CPT

## 2020-09-24 PROCEDURE — 82705 FATS/LIPIDS FECES QUAL: CPT

## 2020-09-24 PROCEDURE — 83520 IMMUNOASSAY QUANT NOS NONAB: CPT

## 2020-09-26 LAB
ENDOMYSIUM IGA SER QL: NEGATIVE
GLIADIN PEPTIDE IGA SER-ACNC: 2 UNITS (ref 0–19)
GLIADIN PEPTIDE IGG SER-ACNC: 6 UNITS (ref 0–19)
IGA SERPL-MCNC: 164 MG/DL (ref 87–352)
TTG IGA SER-ACNC: <2 U/ML (ref 0–3)
TTG IGG SER-ACNC: 2 U/ML (ref 0–5)

## 2020-09-30 LAB
FAT STL QL: NORMAL
NEUTRAL FAT STL QL: NORMAL

## 2020-10-02 LAB — CALPROTECTIN STL-MCNT: 17 UG/G (ref 0–120)

## 2020-10-03 LAB — ELASTASE PANC STL-MCNT: >500 UG ELAST./G

## 2020-12-15 ENCOUNTER — TRANSCRIBE ORDER (OUTPATIENT)
Dept: SCHEDULING | Age: 50
End: 2020-12-15

## 2020-12-15 DIAGNOSIS — K59.00 CONSTIPATION: ICD-10-CM

## 2020-12-15 DIAGNOSIS — R14.0 BLOATING: Primary | ICD-10-CM

## 2020-12-15 DIAGNOSIS — R10.9 ABDOMINAL PAIN: ICD-10-CM

## 2020-12-21 ENCOUNTER — HOSPITAL ENCOUNTER (OUTPATIENT)
Dept: CT IMAGING | Age: 50
Discharge: HOME | End: 2020-12-21
Attending: PHYSICIAN ASSISTANT
Payer: COMMERCIAL

## 2020-12-21 DIAGNOSIS — R10.9 ABDOMINAL PAIN: ICD-10-CM

## 2020-12-21 DIAGNOSIS — R14.0 BLOATING: ICD-10-CM

## 2020-12-21 DIAGNOSIS — K59.00 CONSTIPATION: ICD-10-CM

## 2020-12-21 PROCEDURE — 74177 CT ABD & PELVIS W/CONTRAST: CPT

## 2020-12-21 PROCEDURE — 74011000636 HC RX REV CODE- 636: Performed by: PHYSICIAN ASSISTANT

## 2020-12-21 RX ADMIN — IOPAMIDOL 100 ML: 612 INJECTION, SOLUTION INTRAVENOUS at 07:48

## 2021-02-22 ENCOUNTER — TRANSCRIBE ORDER (OUTPATIENT)
Dept: SCHEDULING | Age: 51
End: 2021-02-22

## 2021-03-01 ENCOUNTER — TRANSCRIBE ORDER (OUTPATIENT)
Dept: SCHEDULING | Age: 51
End: 2021-03-01

## 2021-03-01 DIAGNOSIS — N64.4 MASTODYNIA: Primary | ICD-10-CM

## 2021-03-11 ENCOUNTER — HOSPITAL ENCOUNTER (OUTPATIENT)
Dept: ULTRASOUND IMAGING | Age: 51
Discharge: HOME | End: 2021-03-11
Attending: NURSE PRACTITIONER
Payer: COMMERCIAL

## 2021-03-11 ENCOUNTER — HOSPITAL ENCOUNTER (OUTPATIENT)
Dept: MAMMOGRAPHY | Age: 51
Discharge: HOME | End: 2021-03-11
Attending: NURSE PRACTITIONER
Payer: COMMERCIAL

## 2021-03-11 DIAGNOSIS — N64.4 MASTODYNIA: ICD-10-CM

## 2021-03-11 PROCEDURE — 77062 BREAST TOMOSYNTHESIS BI: CPT

## 2021-03-11 PROCEDURE — 76642 ULTRASOUND BREAST LIMITED: CPT

## 2022-03-07 ENCOUNTER — HOSPITAL ENCOUNTER (OUTPATIENT)
Dept: LAB | Age: 52
Discharge: HOME | End: 2022-03-07
Payer: COMMERCIAL

## 2022-03-07 LAB
25(OH)D3 SERPL-MCNC: 25.3 NG/ML (ref 30–100)
ALBUMIN SERPL-MCNC: 3.8 G/DL (ref 3.4–5)
ALBUMIN/GLOB SERPL: 1.1 {RATIO} (ref 0.8–1.7)
ALP SERPL-CCNC: 58 U/L (ref 45–117)
ALT SERPL-CCNC: 34 U/L (ref 13–56)
ANION GAP SERPL CALC-SCNC: 2 MMOL/L (ref 3–18)
AST SERPL-CCNC: 13 U/L (ref 10–38)
BASOPHILS # BLD: 0.1 K/UL (ref 0–0.1)
BASOPHILS NFR BLD: 1 % (ref 0–2)
BILIRUB SERPL-MCNC: 0.6 MG/DL (ref 0.2–1)
BUN SERPL-MCNC: 14 MG/DL (ref 7–18)
BUN/CREAT SERPL: 20 (ref 12–20)
CALCIUM SERPL-MCNC: 9.4 MG/DL (ref 8.5–10.1)
CHLORIDE SERPL-SCNC: 104 MMOL/L (ref 100–111)
CHOLEST SERPL-MCNC: 215 MG/DL
CO2 SERPL-SCNC: 31 MMOL/L (ref 21–32)
CREAT SERPL-MCNC: 0.7 MG/DL (ref 0.6–1.3)
DIFFERENTIAL METHOD BLD: NORMAL
EOSINOPHIL # BLD: 0.3 K/UL (ref 0–0.4)
EOSINOPHIL NFR BLD: 5 % (ref 0–5)
ERYTHROCYTE [DISTWIDTH] IN BLOOD BY AUTOMATED COUNT: 12.2 % (ref 11.6–14.5)
GLOBULIN SER CALC-MCNC: 3.6 G/DL (ref 2–4)
GLUCOSE SERPL-MCNC: 87 MG/DL (ref 74–99)
HCT VFR BLD AUTO: 42.5 % (ref 35–45)
HDLC SERPL-MCNC: 62 MG/DL (ref 40–60)
HDLC SERPL: 3.5 {RATIO} (ref 0–5)
HGB BLD-MCNC: 13.9 G/DL (ref 12–16)
IMM GRANULOCYTES # BLD AUTO: 0 K/UL (ref 0–0.04)
IMM GRANULOCYTES NFR BLD AUTO: 0 % (ref 0–0.5)
LDLC SERPL CALC-MCNC: 129.6 MG/DL (ref 0–100)
LIPID PROFILE,FLP: ABNORMAL
LYMPHOCYTES # BLD: 1.8 K/UL (ref 0.9–3.6)
LYMPHOCYTES NFR BLD: 25 % (ref 21–52)
MCH RBC QN AUTO: 28.5 PG (ref 24–34)
MCHC RBC AUTO-ENTMCNC: 32.7 G/DL (ref 31–37)
MCV RBC AUTO: 87.1 FL (ref 78–100)
MONOCYTES # BLD: 0.5 K/UL (ref 0.05–1.2)
MONOCYTES NFR BLD: 7 % (ref 3–10)
NEUTS SEG # BLD: 4.5 K/UL (ref 1.8–8)
NEUTS SEG NFR BLD: 62 % (ref 40–73)
NRBC # BLD: 0 K/UL (ref 0–0.01)
NRBC BLD-RTO: 0 PER 100 WBC
PLATELET # BLD AUTO: 226 K/UL (ref 135–420)
PMV BLD AUTO: 10 FL (ref 9.2–11.8)
POTASSIUM SERPL-SCNC: 4.1 MMOL/L (ref 3.5–5.5)
PROT SERPL-MCNC: 7.4 G/DL (ref 6.4–8.2)
RBC # BLD AUTO: 4.88 M/UL (ref 4.2–5.3)
SODIUM SERPL-SCNC: 137 MMOL/L (ref 136–145)
TRIGL SERPL-MCNC: 117 MG/DL (ref ?–150)
VLDLC SERPL CALC-MCNC: 23.4 MG/DL
WBC # BLD AUTO: 7.2 K/UL (ref 4.6–13.2)

## 2022-03-07 PROCEDURE — 80061 LIPID PANEL: CPT

## 2022-03-07 PROCEDURE — 82306 VITAMIN D 25 HYDROXY: CPT

## 2022-03-07 PROCEDURE — 84436 ASSAY OF TOTAL THYROXINE: CPT

## 2022-03-07 PROCEDURE — 85025 COMPLETE CBC W/AUTO DIFF WBC: CPT

## 2022-03-07 PROCEDURE — 36415 COLL VENOUS BLD VENIPUNCTURE: CPT

## 2022-03-07 PROCEDURE — 80053 COMPREHEN METABOLIC PANEL: CPT

## 2022-03-08 LAB
T4 SERPL-MCNC: 7.6 UG/DL (ref 4.8–13.9)
TSH SERPL DL<=0.05 MIU/L-ACNC: 1.83 UIU/ML (ref 0.36–3.74)

## 2022-03-18 ENCOUNTER — TRANSCRIBE ORDER (OUTPATIENT)
Dept: SCHEDULING | Age: 52
End: 2022-03-18

## 2022-03-18 DIAGNOSIS — Z12.31 OTHER SCREENING MAMMOGRAM: Primary | ICD-10-CM

## 2022-03-19 PROBLEM — M51.369 DDD (DEGENERATIVE DISC DISEASE), LUMBAR: Status: ACTIVE | Noted: 2018-06-13

## 2022-03-19 PROBLEM — M54.16 LUMBAR RADICULOPATHY: Status: ACTIVE | Noted: 2018-07-26

## 2022-03-19 PROBLEM — M51.36 DDD (DEGENERATIVE DISC DISEASE), LUMBAR: Status: ACTIVE | Noted: 2018-06-13

## 2022-03-19 PROBLEM — M54.16 LUMBAR NEURITIS: Status: ACTIVE | Noted: 2018-06-13

## 2022-03-19 PROBLEM — M47.817 LUMBOSACRAL SPONDYLOSIS WITHOUT MYELOPATHY: Status: ACTIVE | Noted: 2018-06-13

## 2022-03-25 ENCOUNTER — HOSPITAL ENCOUNTER (OUTPATIENT)
Dept: MAMMOGRAPHY | Age: 52
Discharge: HOME | End: 2022-03-25
Attending: NURSE PRACTITIONER
Payer: COMMERCIAL

## 2022-03-25 DIAGNOSIS — Z12.31 OTHER SCREENING MAMMOGRAM: ICD-10-CM

## 2022-03-25 PROCEDURE — 77063 BREAST TOMOSYNTHESIS BI: CPT

## 2022-04-06 ENCOUNTER — TELEPHONE (OUTPATIENT)
Dept: PHYSICAL THERAPY | Age: 52
End: 2022-04-06

## 2022-04-06 ENCOUNTER — HOSPITAL ENCOUNTER (OUTPATIENT)
Dept: NUTRITION | Age: 52
End: 2022-04-06

## 2022-09-10 ENCOUNTER — TRANSCRIBE ORDER (OUTPATIENT)
Dept: SCHEDULING | Age: 52
End: 2022-09-10

## 2022-09-10 DIAGNOSIS — R53.83 MALAISE AND FATIGUE: ICD-10-CM

## 2022-09-10 DIAGNOSIS — R11.0 NAUSEA: ICD-10-CM

## 2022-09-10 DIAGNOSIS — R50.9 FEVER: ICD-10-CM

## 2022-09-10 DIAGNOSIS — R10.13 EPIGASTRIC PAIN: Primary | ICD-10-CM

## 2022-09-10 DIAGNOSIS — R53.81 MALAISE AND FATIGUE: ICD-10-CM

## 2022-09-28 ENCOUNTER — HOSPITAL ENCOUNTER (OUTPATIENT)
Dept: CT IMAGING | Age: 52
Discharge: HOME | End: 2022-09-28
Attending: PHYSICIAN ASSISTANT
Payer: COMMERCIAL

## 2022-09-28 DIAGNOSIS — R10.13 EPIGASTRIC PAIN: ICD-10-CM

## 2022-09-28 DIAGNOSIS — R50.9 FEVER: ICD-10-CM

## 2022-09-28 DIAGNOSIS — R11.0 NAUSEA: ICD-10-CM

## 2022-09-28 DIAGNOSIS — R53.83 MALAISE AND FATIGUE: ICD-10-CM

## 2022-09-28 DIAGNOSIS — R53.81 MALAISE AND FATIGUE: ICD-10-CM

## 2022-09-28 PROCEDURE — 74177 CT ABD & PELVIS W/CONTRAST: CPT

## 2022-09-28 PROCEDURE — 74011000636 HC RX REV CODE- 636: Performed by: PHYSICIAN ASSISTANT

## 2022-09-28 RX ADMIN — IOPAMIDOL 100 ML: 612 INJECTION, SOLUTION INTRAVENOUS at 09:32

## 2022-10-05 ENCOUNTER — TRANSCRIBE ORDER (OUTPATIENT)
Dept: SCHEDULING | Age: 52
End: 2022-10-05

## 2022-10-05 DIAGNOSIS — R10.30 GROIN PAIN: Primary | ICD-10-CM

## 2022-10-18 ENCOUNTER — HOSPITAL ENCOUNTER (OUTPATIENT)
Dept: VASCULAR SURGERY | Age: 52
Discharge: HOME | End: 2022-10-18
Attending: PHYSICIAN ASSISTANT
Payer: COMMERCIAL

## 2022-10-18 DIAGNOSIS — R10.13 EPIGASTRIC ABDOMINAL PAIN: ICD-10-CM

## 2022-10-18 PROCEDURE — 93975 VASCULAR STUDY: CPT

## 2022-10-19 LAB
ABDOMINAL DIST AORTA VEL: 98.5 CM/S
ABDOMINAL LT COM ILIAC AP: 0.98 CM
ABDOMINAL LT COM ILIAC TRANS: 1.04 CM
ABDOMINAL MID AORTA VEL: 69.9 CM/S
ABDOMINAL PROX AORTA VEL: 79.7 CM/S
ABDOMINAL RT COM ILIAC AP: 1.1 CM
ABDOMINAL RT COM ILIAC TRANS: 1.1 CM
CELIAC EDV: 26.1 CM/S
CELIAC PSV: 135.7 CM/S
COMMON HEPATIC EDV: 26.1 CM/S
COMMON HEPATIC PSV: 130.2 CM/S
DIST AORTIC AP: 1.31 CM
DIST AORTIC TRANS: 1.5 CM
DIST SMA EDV: 9 CM/S
DIST SMA PSV: 131.8 CM/S
IMA PSV: 142.4 CM/S
LEFT COM ILIAC PROX VEL: 122.7 CM/S
MID AORTIC AP: 1.49 CM
MID AORTIC TRANS: 1.55 CM
MID SMA EDV: 14.5 CM/S
MID SMA PSV: 181.3 CM/S
ORIGIN SMA EDV: 24.4 CM/S
ORIGIN SMA PSV: 200.4 CM/S
PROX AORTIC AP: 1.96 CM
PROX AORTIC TRANS: 1.89 CM
PROX SMA EDV: 14.5 CM/S
PROX SMA PSV: 168.1 CM/S
RIGHT COM ILIAC PROX VEL: 162.2 CM/S
SPLENIC EDV: 28.9 CM/S
SPLENIC PSV: 75.1 CM/S

## 2023-02-10 ENCOUNTER — TRANSCRIBE ORDER (OUTPATIENT)
Dept: REGISTRATION | Age: 53
End: 2023-02-10

## 2023-02-10 ENCOUNTER — HOSPITAL ENCOUNTER (OUTPATIENT)
Dept: LAB | Age: 53
Discharge: HOME | End: 2023-02-10
Payer: COMMERCIAL

## 2023-02-10 DIAGNOSIS — K59.00 CONSTIPATION: ICD-10-CM

## 2023-02-10 DIAGNOSIS — K86.81 EXOCRINE PANCREATIC INSUFFICIENCY: ICD-10-CM

## 2023-02-10 DIAGNOSIS — R14.2 ERUCTATION: ICD-10-CM

## 2023-02-10 DIAGNOSIS — Z80.0 FAMILY HISTORY OF COLON CANCER: ICD-10-CM

## 2023-02-10 DIAGNOSIS — Z86.010 HISTORY OF COLONIC POLYPS: ICD-10-CM

## 2023-02-10 DIAGNOSIS — K59.00 CONSTIPATION: Primary | ICD-10-CM

## 2023-02-10 DIAGNOSIS — R10.13 EPIGASTRIC PAIN: ICD-10-CM

## 2023-02-10 LAB
25(OH)D3 SERPL-MCNC: 39.5 NG/ML (ref 30–100)
ALBUMIN SERPL-MCNC: 3.9 G/DL (ref 3.4–5)
ALBUMIN/GLOB SERPL: 1.2 (ref 0.8–1.7)
ALP SERPL-CCNC: 59 U/L (ref 45–117)
ALT SERPL-CCNC: 23 U/L (ref 13–56)
ANION GAP SERPL CALC-SCNC: 2 MMOL/L (ref 3–18)
AST SERPL-CCNC: 13 U/L (ref 10–38)
BASOPHILS # BLD: 0 K/UL (ref 0–0.1)
BASOPHILS NFR BLD: 1 % (ref 0–2)
BILIRUB SERPL-MCNC: 1 MG/DL (ref 0.2–1)
BUN SERPL-MCNC: 9 MG/DL (ref 7–18)
BUN/CREAT SERPL: 10 (ref 12–20)
CALCIUM SERPL-MCNC: 9.5 MG/DL (ref 8.5–10.1)
CHLORIDE SERPL-SCNC: 105 MMOL/L (ref 100–111)
CO2 SERPL-SCNC: 32 MMOL/L (ref 21–32)
CREAT SERPL-MCNC: 0.93 MG/DL (ref 0.6–1.3)
DIFFERENTIAL METHOD BLD: ABNORMAL
EOSINOPHIL # BLD: 0.3 K/UL (ref 0–0.4)
EOSINOPHIL NFR BLD: 6 % (ref 0–5)
ERYTHROCYTE [DISTWIDTH] IN BLOOD BY AUTOMATED COUNT: 12.4 % (ref 11.6–14.5)
FERRITIN SERPL-MCNC: 94 NG/ML (ref 8–388)
FOLATE SERPL-MCNC: >20 NG/ML (ref 3.1–17.5)
GLOBULIN SER CALC-MCNC: 3.2 G/DL (ref 2–4)
GLUCOSE SERPL-MCNC: 92 MG/DL (ref 74–99)
HCT VFR BLD AUTO: 41.8 % (ref 35–45)
HGB BLD-MCNC: 13.7 G/DL (ref 12–16)
IMM GRANULOCYTES # BLD AUTO: 0 K/UL (ref 0–0.04)
IMM GRANULOCYTES NFR BLD AUTO: 0 % (ref 0–0.5)
IRON SATN MFR SERPL: 20 % (ref 20–50)
IRON SERPL-MCNC: 65 UG/DL (ref 50–175)
LYMPHOCYTES # BLD: 1.4 K/UL (ref 0.9–3.6)
LYMPHOCYTES NFR BLD: 27 % (ref 21–52)
MCH RBC QN AUTO: 29.3 PG (ref 24–34)
MCHC RBC AUTO-ENTMCNC: 32.8 G/DL (ref 31–37)
MCV RBC AUTO: 89.5 FL (ref 78–100)
MONOCYTES # BLD: 0.3 K/UL (ref 0.05–1.2)
MONOCYTES NFR BLD: 5 % (ref 3–10)
NEUTS SEG # BLD: 3.1 K/UL (ref 1.8–8)
NEUTS SEG NFR BLD: 61 % (ref 40–73)
NRBC # BLD: 0 K/UL (ref 0–0.01)
NRBC BLD-RTO: 0 PER 100 WBC
PLATELET # BLD AUTO: 241 K/UL (ref 135–420)
PMV BLD AUTO: 10.2 FL (ref 9.2–11.8)
POTASSIUM SERPL-SCNC: 4.7 MMOL/L (ref 3.5–5.5)
PROT SERPL-MCNC: 7.1 G/DL (ref 6.4–8.2)
RBC # BLD AUTO: 4.67 M/UL (ref 4.2–5.3)
SODIUM SERPL-SCNC: 139 MMOL/L (ref 136–145)
TIBC SERPL-MCNC: 321 UG/DL (ref 250–450)
VIT B12 SERPL-MCNC: 691 PG/ML (ref 211–911)
WBC # BLD AUTO: 5.1 K/UL (ref 4.6–13.2)

## 2023-02-10 PROCEDURE — 82306 VITAMIN D 25 HYDROXY: CPT

## 2023-02-10 PROCEDURE — 85025 COMPLETE CBC W/AUTO DIFF WBC: CPT

## 2023-02-10 PROCEDURE — 82784 ASSAY IGA/IGD/IGG/IGM EACH: CPT

## 2023-02-10 PROCEDURE — 80053 COMPREHEN METABOLIC PANEL: CPT

## 2023-02-10 PROCEDURE — 84630 ASSAY OF ZINC: CPT

## 2023-02-10 PROCEDURE — 82728 ASSAY OF FERRITIN: CPT

## 2023-02-10 PROCEDURE — 82607 VITAMIN B-12: CPT

## 2023-02-10 PROCEDURE — 83540 ASSAY OF IRON: CPT

## 2023-02-10 PROCEDURE — 36415 COLL VENOUS BLD VENIPUNCTURE: CPT

## 2023-02-12 LAB
GLIADIN PEPTIDE IGA SER-ACNC: NORMAL UNITS
GLIADIN PEPTIDE IGG SER-ACNC: NORMAL UNITS
IGA SERPL-MCNC: 176 MG/DL (ref 87–352)
TTG IGA SER-ACNC: <2 U/ML (ref 0–3)
TTG IGG SER-ACNC: <2 U/ML (ref 0–5)

## 2023-02-13 LAB
GLIADIN PEPTIDE IGA SER-ACNC: 5 UNITS (ref 0–19)
GLIADIN PEPTIDE IGG SER-ACNC: 25 UNITS (ref 0–19)
IGA SERPL-MCNC: 176 MG/DL (ref 87–352)
TTG IGA SER-ACNC: <2 U/ML (ref 0–3)
TTG IGG SER-ACNC: <2 U/ML (ref 0–5)

## 2023-02-15 LAB — ZINC SERPL-MCNC: 81 UG/DL (ref 44–115)

## 2023-09-14 ENCOUNTER — HOSPITAL ENCOUNTER (OUTPATIENT)
Facility: HOSPITAL | Age: 53
Discharge: HOME | End: 2023-09-14
Payer: COMMERCIAL

## 2023-09-14 ENCOUNTER — TRANSCRIBE ORDERS (OUTPATIENT)
Facility: HOSPITAL | Age: 53
End: 2023-09-14

## 2023-09-14 DIAGNOSIS — Z00.00 ANNUAL PHYSICAL EXAM: Primary | ICD-10-CM

## 2023-09-14 DIAGNOSIS — Z00.00 ANNUAL PHYSICAL EXAM: ICD-10-CM

## 2023-09-14 DIAGNOSIS — E55.9 VITAMIN D DEFICIENCY: ICD-10-CM

## 2023-09-14 LAB
25(OH)D3 SERPL-MCNC: 52.9 NG/ML (ref 30–100)
ALBUMIN SERPL-MCNC: 4.1 G/DL (ref 3.4–5)
ALBUMIN/GLOB SERPL: 1.1 (ref 0.8–1.7)
ALP SERPL-CCNC: 71 U/L (ref 45–117)
ALT SERPL-CCNC: 39 U/L (ref 13–56)
ANION GAP SERPL CALC-SCNC: 5 MMOL/L (ref 3–18)
AST SERPL-CCNC: 17 U/L (ref 10–38)
BASOPHILS # BLD: 0.1 K/UL (ref 0–0.1)
BASOPHILS NFR BLD: 1 % (ref 0–2)
BILIRUB SERPL-MCNC: 0.7 MG/DL (ref 0.2–1)
BUN SERPL-MCNC: 13 MG/DL (ref 7–18)
BUN/CREAT SERPL: 17 (ref 12–20)
CALCIUM SERPL-MCNC: 9.8 MG/DL (ref 8.5–10.1)
CHLORIDE SERPL-SCNC: 102 MMOL/L (ref 100–111)
CHOLEST SERPL-MCNC: 190 MG/DL
CO2 SERPL-SCNC: 29 MMOL/L (ref 21–32)
CREAT SERPL-MCNC: 0.77 MG/DL (ref 0.6–1.3)
DIFFERENTIAL METHOD BLD: ABNORMAL
EOSINOPHIL # BLD: 0.2 K/UL (ref 0–0.4)
EOSINOPHIL NFR BLD: 4 % (ref 0–5)
ERYTHROCYTE [DISTWIDTH] IN BLOOD BY AUTOMATED COUNT: 12.1 % (ref 11.6–14.5)
GLOBULIN SER CALC-MCNC: 3.6 G/DL (ref 2–4)
GLUCOSE SERPL-MCNC: 88 MG/DL (ref 74–99)
HCT VFR BLD AUTO: 46.2 % (ref 35–45)
HDLC SERPL-MCNC: 63 MG/DL (ref 40–60)
HDLC SERPL: 3 (ref 0–5)
HGB BLD-MCNC: 15.3 G/DL (ref 12–16)
IMM GRANULOCYTES # BLD AUTO: 0 K/UL (ref 0–0.04)
IMM GRANULOCYTES NFR BLD AUTO: 0 % (ref 0–0.5)
LDLC SERPL CALC-MCNC: 114.2 MG/DL (ref 0–100)
LIPID PANEL: ABNORMAL
LYMPHOCYTES # BLD: 1.6 K/UL (ref 0.9–3.6)
LYMPHOCYTES NFR BLD: 27 % (ref 21–52)
MCH RBC QN AUTO: 29.3 PG (ref 24–34)
MCHC RBC AUTO-ENTMCNC: 33.1 G/DL (ref 31–37)
MCV RBC AUTO: 88.5 FL (ref 78–100)
MONOCYTES # BLD: 0.3 K/UL (ref 0.05–1.2)
MONOCYTES NFR BLD: 5 % (ref 3–10)
NEUTS SEG # BLD: 3.6 K/UL (ref 1.8–8)
NEUTS SEG NFR BLD: 63 % (ref 40–73)
NRBC # BLD: 0 K/UL (ref 0–0.01)
NRBC BLD-RTO: 0 PER 100 WBC
PLATELET # BLD AUTO: 231 K/UL (ref 135–420)
PMV BLD AUTO: 10.5 FL (ref 9.2–11.8)
POTASSIUM SERPL-SCNC: 4.1 MMOL/L (ref 3.5–5.5)
PROT SERPL-MCNC: 7.7 G/DL (ref 6.4–8.2)
RBC # BLD AUTO: 5.22 M/UL (ref 4.2–5.3)
SODIUM SERPL-SCNC: 136 MMOL/L (ref 136–145)
T4 FREE SERPL-MCNC: 1 NG/DL (ref 0.7–1.5)
TRIGL SERPL-MCNC: 64 MG/DL
TSH SERPL DL<=0.05 MIU/L-ACNC: 1.44 UIU/ML (ref 0.36–3.74)
VLDLC SERPL CALC-MCNC: 12.8 MG/DL
WBC # BLD AUTO: 5.7 K/UL (ref 4.6–13.2)

## 2023-09-14 PROCEDURE — 84439 ASSAY OF FREE THYROXINE: CPT

## 2023-09-14 PROCEDURE — 82306 VITAMIN D 25 HYDROXY: CPT

## 2023-09-14 PROCEDURE — 80053 COMPREHEN METABOLIC PANEL: CPT

## 2023-09-14 PROCEDURE — 84443 ASSAY THYROID STIM HORMONE: CPT

## 2023-09-14 PROCEDURE — 36415 COLL VENOUS BLD VENIPUNCTURE: CPT

## 2023-09-14 PROCEDURE — 80061 LIPID PANEL: CPT

## 2023-09-14 PROCEDURE — 85025 COMPLETE CBC W/AUTO DIFF WBC: CPT

## 2023-09-19 ENCOUNTER — TRANSCRIBE ORDERS (OUTPATIENT)
Facility: HOSPITAL | Age: 53
End: 2023-09-19

## 2023-09-19 ENCOUNTER — HOSPITAL ENCOUNTER (OUTPATIENT)
Facility: HOSPITAL | Age: 53
Discharge: HOME | End: 2023-09-22
Payer: COMMERCIAL

## 2023-09-19 DIAGNOSIS — R05.8 MILLERS' COUGH: ICD-10-CM

## 2023-09-19 DIAGNOSIS — R05.8 MILLERS' COUGH: Primary | ICD-10-CM

## 2023-09-19 PROCEDURE — 71046 X-RAY EXAM CHEST 2 VIEWS: CPT

## 2023-09-19 PROCEDURE — 72070 X-RAY EXAM THORAC SPINE 2VWS: CPT

## 2023-09-28 ENCOUNTER — HOSPITAL ENCOUNTER (OUTPATIENT)
Facility: HOSPITAL | Age: 53
End: 2023-09-28
Payer: COMMERCIAL

## 2023-09-28 ENCOUNTER — HOSPITAL ENCOUNTER (OUTPATIENT)
Facility: HOSPITAL | Age: 53
Discharge: HOME | End: 2023-09-28
Payer: COMMERCIAL

## 2023-09-28 DIAGNOSIS — G50.1 ATYPICAL FACE PAIN: ICD-10-CM

## 2023-09-28 PROCEDURE — 70470 CT HEAD/BRAIN W/O & W/DYE: CPT

## 2023-09-28 PROCEDURE — 6360000004 HC RX CONTRAST MEDICATION: Performed by: NURSE PRACTITIONER

## 2023-09-28 PROCEDURE — 70487 CT MAXILLOFACIAL W/DYE: CPT

## 2023-09-28 RX ADMIN — IOPAMIDOL 80 ML: 612 INJECTION, SOLUTION INTRAVENOUS at 08:16

## 2023-10-11 ENCOUNTER — TRANSCRIBE ORDERS (OUTPATIENT)
Facility: HOSPITAL | Age: 53
End: 2023-10-11

## 2023-10-11 DIAGNOSIS — Z12.31 VISIT FOR SCREENING MAMMOGRAM: Primary | ICD-10-CM

## 2023-10-13 ENCOUNTER — HOSPITAL ENCOUNTER (OUTPATIENT)
Facility: HOSPITAL | Age: 53
Discharge: HOME | End: 2023-10-13
Payer: COMMERCIAL

## 2023-10-13 DIAGNOSIS — Z12.31 VISIT FOR SCREENING MAMMOGRAM: ICD-10-CM

## 2023-10-13 PROCEDURE — 77063 BREAST TOMOSYNTHESIS BI: CPT

## 2024-07-24 NOTE — PROGRESS NOTES
Patient: Khushbu Harris                MRN: [id number]       SSN: [id number]  YOB: 1970        AGE: 54 y.o.        SEX: female      PCP: Shila Mcclain, ROSALIND - NP  07/26/24    Chief Complaint   Patient presents with    Hip Pain     RIGHT HIP      HISTORY:  Khushbu Harris is a 54 y.o. female seen for several year history of  right hip, buttock, and lower back pain.  She denies any recent injury. She has a h/o sciatica but she states that this pain feels different from her usual sciatic pain. She is unable to lay on her right side at night. She states she experiences pain with hip abduction movement. She states she previously responded to PT for her back and neck. She states she has a history of side effects to medications so she tries to avoid them.     She was previously seen for radiating lower back pain.  Abdominal/pelvic CT scan at [facility] 5/23/18 was negative for musculoskeletal problem.     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 She states she stands on her feet for 5 hours daily.  She tries to avoid heavy lifting due to her umbilical hernia. She previously was working as a  for [employer].  She lives in [place] with her . She has three children, her  has two. She also has 5 grandchildren. Current weight is 167 pounds. She is 5'3\" tall.   Wt Readings from Last 3 Encounters:   07/26/24 58.5 kg (129 lb)      Body mass index is 22.85 kg/m².    Patient Active Problem List   Diagnosis    Lumbar radiculopathy    Lumbosacral spondylosis without myelopathy    DDD (degenerative disc disease), lumbar    Lumbar neuritis       Social History     Tobacco Use    Smoking status: Former     Current packs/day: 0.00     Types: Cigarettes     Quit date: 7/22/2009     Years since quitting: 15.0    Smokeless tobacco: Never   Substance Use Topics    Alcohol use: Yes     Alcohol/week: 3.0 standard drinks of alcohol        Allergies

## 2024-07-26 ENCOUNTER — OFFICE VISIT (OUTPATIENT)
Age: 54
End: 2024-07-26
Payer: COMMERCIAL

## 2024-07-26 VITALS — WEIGHT: 129 LBS | TEMPERATURE: 97 F | HEIGHT: 63 IN | BODY MASS INDEX: 22.86 KG/M2

## 2024-07-26 DIAGNOSIS — M70.61 TROCHANTERIC BURSITIS, RIGHT HIP: ICD-10-CM

## 2024-07-26 DIAGNOSIS — M79.7 FIBROMYALGIA: ICD-10-CM

## 2024-07-26 DIAGNOSIS — M54.31 RIGHT SIDED SCIATICA: ICD-10-CM

## 2024-07-26 DIAGNOSIS — M25.551 RIGHT HIP PAIN: Primary | ICD-10-CM

## 2024-07-26 DIAGNOSIS — K42.9 UMBILICAL HERNIA WITHOUT OBSTRUCTION AND WITHOUT GANGRENE: ICD-10-CM

## 2024-07-26 DIAGNOSIS — M54.16 LUMBAR RADICULOPATHY: ICD-10-CM

## 2024-07-26 DIAGNOSIS — M54.2 NECK PAIN: ICD-10-CM

## 2024-07-26 DIAGNOSIS — M25.859 FEMORAL ACETABULAR IMPINGEMENT: ICD-10-CM

## 2024-07-26 PROCEDURE — 99213 OFFICE O/P EST LOW 20 MIN: CPT | Performed by: SPECIALIST

## 2024-07-26 PROCEDURE — 73502 X-RAY EXAM HIP UNI 2-3 VIEWS: CPT | Performed by: SPECIALIST

## 2024-08-14 ENCOUNTER — HOSPITAL ENCOUNTER (OUTPATIENT)
Facility: HOSPITAL | Age: 54
Setting detail: RECURRING SERIES
Discharge: HOME | End: 2024-08-17
Payer: COMMERCIAL

## 2024-08-14 PROCEDURE — 97162 PT EVAL MOD COMPLEX 30 MIN: CPT

## 2024-08-14 NOTE — PROGRESS NOTES
[facility] [address]  [address]  [place] [address]  [phone] fax    Plan of Care / Statement of Necessity for Physical Therapy Services     Patient Name: Khushbu Harris : 1970   Medical   Diagnosis: Other low back pain [M54.59]  Pain in right hip [M25.551]  Neck pain [M54.2] Treatment Diagnosis: M25.551  RIGHT HIP PAIN  and M54.2, G89.29  CHRONIC NECK PAIN and M54.59, G89.29  CHRONIC LOWER BACK PAIN      Onset Date: 24 (referral date)  Payor :  Payor: SUSHIL / Plan: [employer] [place] / Product Type: *No Product type* /    Referral Source: Mark Sandoval MD Start of Care (SOC): 2024   Prior Hospitalization: See medical history Provider #: [id number]   Prior Level of Function: Independent with all functional mobility, ADLs/IADLs   Comorbidities: Fibromyalgia, anxiety        Assessment / key information:    Patient is a pleasant 54 year old female with c/o R hip, low back, and neck pain. Patient ambulates in clinic space with no AD; no observable gait deviations. Patient presents with R hip weakness, limited AROM R hip ER/IR, limited gross c/s AROM, decreased flexibility in BLE, and impaired functional mobility, which affects her QOL. Inconclusive hip testing, with possible impingement and/or pelvosacral component. Patient provided with HEP printout and performs initial exercises with moderate vc/tc and no adverse effect. Patient education on anatomy of present condition, symptom modulation, activity modification, HEP and importance of compliance, role of PT, and POC. Patient will benefit from skilled PT to address the above deficits and improve functional mobility so that she can return to ADLs, ambulation, and work duties with minimal pain or limitation.     Evaluation Complexity:  History:  MEDIUM  Complexity : 1-2 comorbidities / personal factors will impact the outcome/ POC ; Examination:  MEDIUM Complexity : 3 Standardized tests and 
c/s, h/o frozen shoulder on R  Allergies: see EMR  Work/hobbies: Currently working part-time at [employer] - standing for entire shift.   Motivation: Good  Goals: \"Less pain\"       OBJECTIVE/EXAMINATION    Modalities Rationale:     decrease edema, decrease inflammation, decrease pain, increase tissue extensibility, and increase muscle contraction/control to improve patient's ability to progress to PLOF and address remaining functional goals.     min [] Estim Unattended, type/location:                                      []  w/ice    []  w/heat    min [] Estim Attended, type/location:                                     []  w/US     []  w/ice    []  w/heat    []  TENS insruct      min []  Mechanical Traction: type/lbs                   []  pro   []  sup   []  int   []  cont    []  before manual    []  after manual    min []  Ultrasound, settings/location:      min []  Iontophoresis w/ dexamethasone, location:                                               []  take home patch       []  in clinic    min  unbilled []  Ice     []  Heat    location/position:     min []  Vasopneumatic Device, press/temp:    If using vaso (only need to measure limb vaso being performed on)      pre-treatment girth :       post-treatment girth :       measured at (landmark location) :      min []  Other:    Skin assessment post-treatment (if applicable):    []  intact    []  redness- no adverse reaction                  []redness - adverse reaction:          30 min [x]Eval  - untimed                  Therapeutic Procedures:  Tx Min Billable or 1:1 Min (if diff from Tx Min) Procedure, Rationale, Specifics          Details if applicable:            Details if applicable:            Details if applicable:            Details if applicable:            Details if applicable:     Total Total Reminder: MC/BC bill using total billable min of TIMED therapeutic procedures (example: do not include dry needle or estim unattended, both untimed codes, in

## 2024-08-23 ENCOUNTER — HOSPITAL ENCOUNTER (OUTPATIENT)
Facility: HOSPITAL | Age: 54
Setting detail: RECURRING SERIES
Discharge: HOME | End: 2024-08-26
Payer: COMMERCIAL

## 2024-08-23 PROCEDURE — 97110 THERAPEUTIC EXERCISES: CPT

## 2024-08-23 PROCEDURE — 97140 MANUAL THERAPY 1/> REGIONS: CPT

## 2024-08-23 PROCEDURE — 97112 NEUROMUSCULAR REEDUCATION: CPT

## 2024-08-23 NOTE — PROGRESS NOTES
PHYSICAL / OCCUPATIONAL THERAPY - DAILY TREATMENT NOTE    Patient Name: Khushbu Harris    Date: 2024    : 1970  Insurance: Payor: SUSHIL / [place]: [employer] [place] / Product Type: *No Product type* /      Patient  verified Yes     Visit #   Current / Total 2 10   Time   In / Out 214 310   Pain   In / Out 7/10 R hip  4/10 neck 3/10 R hip  0/10 neck   Subjective Functional Status/Changes: R hip pain 7/10, it's just always there - sometimes sharp shooting  neck always hurts (4/10)     TREATMENT AREA =  Other low back pain [M54.59]  Pain in right hip [M25.551]  Neck pain [M54.2]     OBJECTIVE:    Modalities Rationale:     decrease edema, decrease inflammation, decrease pain, and increase tissue extensibility to improve patient's ability to progress to PLOF and address remaining functional goals.     min [] Estim Unattended, type/location:                                      []  w/ice    []  w/heat    min [] Estim Attended, type/location:                                     []  w/US     []  w/ice    []  w/heat    []  TENS insruct      min []  Mechanical Traction: type/lbs                   []  pro   []  sup   []  int   []  cont    []  before manual    []  after manual    min []  Ultrasound, settings/location:     10 min  unbill [x]  Ice     [x]  Heat    location/position: CP R hip  MHP neck    min []  Paraffin,  details:     min []  Vasopneumatic Device, press/temp:     min []  Whirlpool / Fluido:    If using vaso (only need to measure limb vaso being performed on)      pre-treatment girth :       post-treatment girth :       measured at (landmark location) :      min []  Other:    Skin assessment post-treatment:   Intact      Therapeutic Procedures:    Tx Min Billable or 1:1 Min (if diff from Tx Min) Procedure, Rationale, Specifics   18  [id number] Manual Therapy (timed):  decrease pain, increase ROM, increase tissue extensibility, and decrease trigger points to improve patient's ability to progress to PLOF and  pain in R hip.   Continue to initiate exercises as pt tolerates and assess her response to today's visit at the next    Patient will continue to benefit from skilled PT / OT services to modify and progress therapeutic interventions, analyze and address functional mobility deficits, analyze and address ROM deficits, analyze and address strength deficits, analyze and address soft tissue restrictions, analyze and cue for proper movement patterns, analyze and modify for postural abnormalities, analyze and address imbalance/dizziness, and instruct in home and community integration to address functional deficits and attain remaining goals.    Progress toward goals / Updated goals:  []  See Progress Note/Recertification  Short Term Goals: To be accomplished in 2 weeks  Pt will be able to report a </= 7/10 pain rating at worst to improve patient's ability to tolerate prolonged functional activities at home.               Eval: 10/10 at worst  Current: 8/23/24, arriving with 7/10, thought pain elevates at end of day     Pt will be independent with HEP to facilitate carry-over of functional gains made in PT at home & community.               Eval: HEP established, printout provided      Long Term Goals: To be accomplished in 10 weeks  Patient will be able to improve AROM in c/s B rot and ext to >/= 50 deg to improve patient's ability to look over her shoulder while driving and look overhead while cleaning at home.               Eval: L rot: 32 deg, R rot: 40 deg, ext 35 deg      Patient will be able to improve AROM in B hip ER/IR to at least 40 deg to improve patient's ability to don/doff socks and shoes at home.               Eval: ER: 37 deg L, 30 deg R p!; IR: 32 deg L, 22 deg R p!      Pt will be able to improve strength in B hip ABD and L hip flex/ER to at least 5/5 to improve patient's ability to ambulate and negotiate stairs at home & community.   Eval: B hip ABD 4/5 with p! On R; L hip flex 4/5 p!, L hip ER

## 2024-08-26 ENCOUNTER — HOSPITAL ENCOUNTER (OUTPATIENT)
Facility: HOSPITAL | Age: 54
Setting detail: RECURRING SERIES
Discharge: HOME | End: 2024-08-29
Payer: COMMERCIAL

## 2024-08-26 PROCEDURE — 97112 NEUROMUSCULAR REEDUCATION: CPT

## 2024-08-26 PROCEDURE — 97110 THERAPEUTIC EXERCISES: CPT

## 2024-08-26 PROCEDURE — 97140 MANUAL THERAPY 1/> REGIONS: CPT

## 2024-08-26 NOTE — PROGRESS NOTES
PHYSICAL / OCCUPATIONAL THERAPY - DAILY TREATMENT NOTE    Patient Name: Khushbu Harris    Date: 2024    : 1970  Insurance: Payor: SUSHIL / [place]: [employer] [place] / Product Type: *No Product type* /      Patient  verified Yes     Visit #   Current / Total 3 10   Time   In / Out 130 226   Pain   In / Out 8 hip  4 neck 0   Subjective Functional Status/Changes: \"My hip is hurting.\"     TREATMENT AREA =  Other low back pain [M54.59]  Pain in right hip [M25.551]  Neck pain [M54.2]     OBJECTIVE    Modalities Rationale:     decrease pain to improve patient's ability to progress to PLOF and address remaining functional goals.     min [] Estim Unattended, type/location:                                      []  w/ice    []  w/heat    min [] Estim Attended, type/location:                                     []  w/US     []  w/ice    []  w/heat    []  TENS insruct      min []  Mechanical Traction: type/lbs                   []  pro   []  sup   []  int   []  cont    []  before manual    []  after manual    min []  Ultrasound, settings/location:     10 min  unbill [x]  Ice     [x]  Heat    location/position:  Ice to right hip, heat to neck in sitting    min []  Paraffin,  details:     min []  Vasopneumatic Device, press/temp:     min []  Whirlpool / Fluido:    If using vaso (only need to measure limb vaso being performed on)      pre-treatment girth :       post-treatment girth :       measured at (landmark location) :      min []  Other:    Skin assessment post-treatment:   Intact      Therapeutic Procedures:    Tx Min Billable or 1:1 Min (if diff from Tx Min) Procedure, Rationale, Specifics    Therapeutic Exercise (timed):  increase ROM, strength, coordination, balance, and proprioception to improve patient's ability to progress to PLOF and address remaining functional goals. (see flow sheet as applicable)     Details if applicable:        Neuromuscular Re-Education (timed):  improve balance,  4+/5 p!     Pain with abduction [Date assessed: 08/26/2024]    Pt will be able to perform x10 functional squats with proper body mechanics and no LBP/R hip pain provocation so that she can perform household chores and work duties with minimal pain.               Eval: weight shifting towards R 2/2 limited hip ER/IR, excessive knee flex, increase in R hip pain     Initiated squatting today [Date assessed: 08/26/2024]    Pt will report compliance with home HEP at end of care to enhance carry over of fuctional gains made with skilled therapy services in order to improve quality of life.                Eval: Established [facility] HEP at [facility]  Reports daily compliance [Date assessed: 08/26/2024]     Pt will improve NDI score to </= 13% to demonstrate improvement in patient's ability to perform unrestricted ADLs/IADLs at home & community.              Eval: 28%    Assess at 30 day kasey [Date assessed: 08/26/2024]    Pt will improve LEFS score to >/= 68 to demonstrate improvement in patient's ability to perform unrestricted ADLs/IADLs at home & community.              Eval: 59   Assess at 30 day kasey [Date assessed: 08/26/2024]    Next PN/ RC due 09/12/2024  Auth due (visit number/ date) TORO    PLAN  - Continue Plan of Care    Mundo Soto PTA, [facility]    8/26/2024    1:11 PM  If an interpreting service was utilized for treatment of this patient, the contents of this document represent the material reviewed with the patient via the .     Future Appointments   Date Time Provider Department Center   8/26/2024  1:30 PM Mundo Soto, JI [facility]   8/29/2024  2:50 PM Yani Mullen PTA [facility]   9/5/2024  1:30 PM Tricia Villalpando PTA [facility]   9/6/2024  1:30 PM Oneyda Conrad PTA [facility]   9/9/2024  1:30 PM Oneyda Conrad PTA [facility]   9/12/2024  1:30 PM Jeanna Venegas PT [facility]   9/23/2024  1:30 PM Oneyda Conrad PTA [facility]   9/25/2024  9:00 AM Yesika Damian MD

## 2024-08-29 ENCOUNTER — HOSPITAL ENCOUNTER (OUTPATIENT)
Facility: HOSPITAL | Age: 54
Setting detail: RECURRING SERIES
End: 2024-08-29
Payer: COMMERCIAL

## 2024-09-05 ENCOUNTER — HOSPITAL ENCOUNTER (OUTPATIENT)
Facility: HOSPITAL | Age: 54
Setting detail: RECURRING SERIES
Discharge: HOME | End: 2024-09-08
Payer: COMMERCIAL

## 2024-09-05 PROCEDURE — 97110 THERAPEUTIC EXERCISES: CPT

## 2024-09-05 PROCEDURE — 97140 MANUAL THERAPY 1/> REGIONS: CPT

## 2024-09-05 PROCEDURE — 97112 NEUROMUSCULAR REEDUCATION: CPT

## 2024-09-09 ENCOUNTER — HOSPITAL ENCOUNTER (OUTPATIENT)
Facility: HOSPITAL | Age: 54
Setting detail: RECURRING SERIES
Discharge: HOME | End: 2024-09-12
Payer: COMMERCIAL

## 2024-09-09 PROCEDURE — 97110 THERAPEUTIC EXERCISES: CPT

## 2024-09-09 PROCEDURE — 97140 MANUAL THERAPY 1/> REGIONS: CPT

## 2024-09-09 PROCEDURE — 97530 THERAPEUTIC ACTIVITIES: CPT

## 2024-09-12 ENCOUNTER — APPOINTMENT (OUTPATIENT)
Facility: HOSPITAL | Age: 54
End: 2024-09-12
Payer: COMMERCIAL

## 2024-09-16 ENCOUNTER — APPOINTMENT (OUTPATIENT)
Facility: HOSPITAL | Age: 54
End: 2024-09-16
Payer: COMMERCIAL

## 2024-09-19 ENCOUNTER — APPOINTMENT (OUTPATIENT)
Facility: HOSPITAL | Age: 54
End: 2024-09-19
Payer: COMMERCIAL

## 2024-09-23 ENCOUNTER — HOSPITAL ENCOUNTER (OUTPATIENT)
Facility: HOSPITAL | Age: 54
Setting detail: RECURRING SERIES
Discharge: HOME | End: 2024-09-26
Payer: COMMERCIAL

## 2024-09-23 PROCEDURE — 97140 MANUAL THERAPY 1/> REGIONS: CPT

## 2024-09-23 PROCEDURE — 97110 THERAPEUTIC EXERCISES: CPT

## 2024-09-23 PROCEDURE — 97530 THERAPEUTIC ACTIVITIES: CPT

## 2024-09-24 SDOH — HEALTH STABILITY: PHYSICAL HEALTH: ON AVERAGE, HOW MANY DAYS PER WEEK DO YOU ENGAGE IN MODERATE TO STRENUOUS EXERCISE (LIKE A BRISK WALK)?: 0 DAYS

## 2024-09-25 ENCOUNTER — OFFICE VISIT (OUTPATIENT)
Age: 54
End: 2024-09-25
Payer: COMMERCIAL

## 2024-09-25 VITALS
WEIGHT: 129 LBS | OXYGEN SATURATION: 99 % | RESPIRATION RATE: 18 BRPM | HEART RATE: 72 BPM | HEIGHT: 63 IN | BODY MASS INDEX: 22.86 KG/M2

## 2024-09-25 DIAGNOSIS — M70.61 TROCHANTERIC BURSITIS, RIGHT HIP: ICD-10-CM

## 2024-09-25 DIAGNOSIS — M54.16 LUMBAR RADICULOPATHY: Primary | ICD-10-CM

## 2024-09-25 PROCEDURE — 99204 OFFICE O/P NEW MOD 45 MIN: CPT | Performed by: PHYSICAL MEDICINE & REHABILITATION

## 2024-09-25 PROCEDURE — 72100 X-RAY EXAM L-S SPINE 2/3 VWS: CPT | Performed by: PHYSICAL MEDICINE & REHABILITATION

## 2024-09-25 RX ORDER — BUPROPION HYDROCHLORIDE 150 MG/1
150 TABLET ORAL DAILY
COMMUNITY

## 2024-09-25 RX ORDER — METRONIDAZOLE 7.5 MG/G
GEL VAGINAL
COMMUNITY

## 2024-09-26 ENCOUNTER — APPOINTMENT (OUTPATIENT)
Facility: HOSPITAL | Age: 54
End: 2024-09-26
Payer: COMMERCIAL

## 2024-09-30 ENCOUNTER — APPOINTMENT (OUTPATIENT)
Facility: HOSPITAL | Age: 54
End: 2024-09-30
Payer: COMMERCIAL

## 2025-02-06 ENCOUNTER — HOSPITAL ENCOUNTER (OUTPATIENT)
Facility: HOSPITAL | Age: 55
Discharge: HOME | End: 2025-02-09
Payer: COMMERCIAL

## 2025-02-06 DIAGNOSIS — R05.3 CHRONIC COUGH: ICD-10-CM

## 2025-02-06 PROCEDURE — 71046 X-RAY EXAM CHEST 2 VIEWS: CPT

## 2025-03-10 ENCOUNTER — TRANSCRIBE ORDERS (OUTPATIENT)
Facility: HOSPITAL | Age: 55
End: 2025-03-10

## 2025-03-10 DIAGNOSIS — Z12.31 ENCOUNTER FOR SCREENING MAMMOGRAM FOR MALIGNANT NEOPLASM OF BREAST: Primary | ICD-10-CM

## 2025-05-15 ENCOUNTER — HOSPITAL ENCOUNTER (OUTPATIENT)
Facility: HOSPITAL | Age: 55
Discharge: HOME | End: 2025-05-18
Payer: COMMERCIAL

## 2025-05-15 VITALS — BODY MASS INDEX: 22.85 KG/M2 | WEIGHT: 128.97 LBS | HEIGHT: 63 IN

## 2025-05-15 DIAGNOSIS — Z12.31 OTHER SCREENING MAMMOGRAM: ICD-10-CM

## 2025-05-15 PROCEDURE — 77063 BREAST TOMOSYNTHESIS BI: CPT

## (undated) DEVICE — (D)SYR 10ML 1/5ML GRAD NSAF -- PKGING CHANGE USE ITEM 338027

## (undated) DEVICE — COVER LT HNDL FLX

## (undated) DEVICE — COLUMN DRAPE

## (undated) DEVICE — CARTRIDGE CLP LIG HEMLOK GRN --

## (undated) DEVICE — DRAPE TOWEL: Brand: CONVERTORS

## (undated) DEVICE — ARM DRAPE

## (undated) DEVICE — KENDALL SCD EXPRESS SLEEVES, KNEE LENGTH, MEDIUM: Brand: KENDALL SCD

## (undated) DEVICE — BAG SPEC RETRV 275ML 10ML DISPOSABLE RELIACATCH

## (undated) DEVICE — PREP SKN CHLRAPRP 26ML TNT -- CONVERT TO ITEM 373320

## (undated) DEVICE — STERILE POLYISOPRENE POWDER-FREE SURGICAL GLOVES: Brand: PROTEXIS

## (undated) DEVICE — NEEDLE HYPO 25GA L1.5IN BVL ORIENTED ECLIPSE

## (undated) DEVICE — DERMABOND SKIN ADH 0.7ML -- DERMABOND ADVANCED 12/BX

## (undated) DEVICE — BLADELESS OBTURATOR: Brand: WECK VISTA

## (undated) DEVICE — Device

## (undated) DEVICE — SOLUTION IV 1000ML 0.9% SOD CHL

## (undated) DEVICE — BLADELESS OPTICAL TROCAR WITH FIXATION CANNULA: Brand: VERSAPORT

## (undated) DEVICE — SOFT SILICONE HYDROCELLULAR SACRUM DRESSING WITH LOCK AWAY LAYER: Brand: ALLEVYN LIFE SACRUM (LARGE) PACK OF 10

## (undated) DEVICE — SOL ANTI-FOG 6ML MEDC -- MEDICHOICE - CONVERT TO 358427

## (undated) DEVICE — SEAL UNIV 5-8MM DISP BX/10 -- DA VINCI XI - SNGL USE

## (undated) DEVICE — COVER MPLR TIP CRV SCIS ACC DA VINCI

## (undated) DEVICE — TIP COVER ACCESSORY

## (undated) DEVICE — SUTURE MCRYL SZ 4-0 L27IN ABSRB UD L24MM PS-1 3/8 CIR PRIM Y935H

## (undated) DEVICE — REM POLYHESIVE ADULT PATIENT RETURN ELECTRODE: Brand: VALLEYLAB